# Patient Record
Sex: FEMALE | Race: WHITE | NOT HISPANIC OR LATINO | Employment: UNEMPLOYED | URBAN - NONMETROPOLITAN AREA
[De-identification: names, ages, dates, MRNs, and addresses within clinical notes are randomized per-mention and may not be internally consistent; named-entity substitution may affect disease eponyms.]

---

## 2017-12-26 ENCOUNTER — HOSPITAL ENCOUNTER (EMERGENCY)
Facility: HOSPITAL | Age: 42
Discharge: HOME | End: 2017-12-26
Attending: EMERGENCY MEDICINE | Admitting: EMERGENCY MEDICINE
Payer: COMMERCIAL

## 2017-12-26 VITALS
RESPIRATION RATE: 22 BRPM | DIASTOLIC BLOOD PRESSURE: 86 MMHG | HEART RATE: 102 BPM | SYSTOLIC BLOOD PRESSURE: 161 MMHG | TEMPERATURE: 98.6 F | OXYGEN SATURATION: 88 %

## 2017-12-26 DIAGNOSIS — S01.91XA LACERATION OF HEAD: Primary | ICD-10-CM

## 2017-12-26 PROCEDURE — 90471 IMMUNIZATION ADMIN: CPT

## 2017-12-26 PROCEDURE — 99282 EMERGENCY DEPT VISIT SF MDM: CPT

## 2017-12-26 PROCEDURE — 90715 TDAP VACCINE 7 YRS/> IM: CPT | Performed by: EMERGENCY MEDICINE

## 2017-12-26 RX ORDER — LIDOCAINE HYDROCHLORIDE AND EPINEPHRINE 10; 10 MG/ML; UG/ML
20 INJECTION, SOLUTION INFILTRATION; PERINEURAL ONCE
Status: COMPLETED | OUTPATIENT
Start: 2017-12-26 | End: 2017-12-26

## 2017-12-26 RX ADMIN — LIDOCAINE HYDROCHLORIDE,EPINEPHRINE BITARTRATE 20 ML: 10; .01 INJECTION, SOLUTION INFILTRATION; PERINEURAL at 10:23

## 2017-12-26 RX ADMIN — TETANUS TOXOID, REDUCED DIPHTHERIA TOXOID AND ACELLULAR PERTUSSIS VACCINE, ADSORBED 0.5 ML: 5; 2.5; 8; 8; 2.5 SUSPENSION INTRAMUSCULAR at 10:21

## 2017-12-26 NOTE — DISCHARGE INSTRUCTIONS
Laceration   WHAT YOU NEED TO KNOW:   A laceration is an injury to the skin and the soft tissue underneath it  Lacerations happen when you are cut or hit by something  They can happen anywhere on the body  DISCHARGE INSTRUCTIONS:   Return to the emergency department if:   · You have heavy bleeding or bleeding that does not stop after 10 minutes of holding firm, direct pressure over the wound  · Your wound opens up  Contact your healthcare provider if:   · You have a fever or chills  · Your laceration is red, warm, or swollen  · You have red streaks on your skin coming from your wound  · You have white or yellow drainage from the wound that smells bad  · You have pain that gets worse, even after treatment  · You have questions or concerns about your condition or care  Medicines:   · Prescription pain medicine  may be given  Ask how to take this medicine safely  · Antibiotics  help treat or prevent a bacterial infection  · Take your medicine as directed  Contact your healthcare provider if you think your medicine is not helping or if you have side effects  Tell him or her if you are allergic to any medicine  Keep a list of the medicines, vitamins, and herbs you take  Include the amounts, and when and why you take them  Bring the list or the pill bottles to follow-up visits  Carry your medicine list with you in case of an emergency  Care for your wound as directed:   · Do not get your wound wet  until your healthcare provider says it is okay  Do not soak your wound in water  Do not go swimming until your healthcare provider says it is okay  Carefully wash the wound with soap and water  Gently pat the area dry or allow it to air dry  · Change your bandages  when they get wet, dirty, or after washing  Apply new, clean bandages as directed  Do not apply elastic bandages or tape too tight  Do not put powders or lotions over your incision  · Apply antibiotic ointment as directed  Your healthcare provider may give you antibiotic ointment to put over your wound if you have stitches  If you have strips of tape over your incision, let them dry up and fall off on their own  If they do not fall off within 14 days, gently remove them  If you have glue over your wound, do not remove or pick at it  If your glue comes off, do not replace it with glue that you have at home  · Check your wound every day for signs of infection such as swelling, redness, or pus  Self-care:   · Apply ice  on your wound for 15 to 20 minutes every hour or as directed  Use an ice pack, or put crushed ice in a plastic bag  Cover it with a towel  Ice helps prevent tissue damage and decreases swelling and pain  · Use a splint as directed  A splint will decrease movement and stress on your wound  It may help it heal faster  A splint may be used for lacerations over joints or areas of your body that bend  Ask your healthcare provider how to apply and remove a splint  · Decrease scarring of your wound  by applying ointments as directed  Do not apply ointments until your healthcare provider says it is okay  You may need to wait until your wound is healed  Ask which ointment to buy and how often to use it  After your wound is healed, use sunscreen over the area when you are out in the sun  You should do this for at least 6 months to 1 year after your injury  Follow up with your healthcare provider as directed: You may need to follow up in 24 to 48 hours to have your wound checked for infection  You will need to return in 3 to 14 days if you have stitches or staples so they can be removed  Care for your wound as directed to prevent infection and help it heal  Write down your questions so you remember to ask them during your visits  © 2017 [employer] Information is for End User's use only and may not be sold, redistributed or otherwise used for commercial purposes   All illustrations and images included in [facility] are the copyrighted property of [employer]  or [address]  The above information is an  only  It is not intended as medical advice for individual conditions or treatments  Talk to your doctor, nurse or pharmacist before following any medical regimen to see if it is safe and effective for you

## 2017-12-26 NOTE — ED PROVIDER NOTES
History  Chief Complaint   Patient presents with    Head Laceration     Matty Fine asleep on the toilet and fell forward, tried to catch self by grabbing the garbage can, strike forehead on baseboard  Denies LOC     50-year-old female patient presents to the emergency department for evaluation of a significant left-sided head laceration sustained when she fell asleep on the toilet, falling forward onto a plastic trash can, falling forward further onto a cast iron radiator  The patient is being worked up for sleep apnea believes that she just fell asleep  Currently, the patient has no other complaints  The patient has no headache, no double vision, no blurry vision, no changes in vision, no obvious signs of concussion  Because none of these are present CT scan is needed  The patient's neck was cleared via nexus criteria  Laceration will be seven  Laceration, which is approximately 9 cm in length and through all dural layers an underlying muscle tissue was anesthetized utilizing lidocaine with epinephrine  Patient is prepped and draped in the normal sterile fashion  The area was cleansed thoroughly  Fifteen single simple interrupted sutures were placed with 4-0 and five 0 nylon suture  One subcuticular stitch was placed initially so as to a closely approximate the wound edges  The patient, suffers from morbid obesity, at the end of the procedure was still pain free, symptom free, will be discharged with instructions to follow up in the emergency department should she develops any symptoms of concussion  History provided by:  Patient   used: No    Laceration   Depth:   Through dermis  Bleeding: venous    Laceration mechanism:  Blunt object  Pain details:     Quality:  Aching    Severity:  Mild    Timing:  Constant    Progression:  Worsening  Foreign body present:  No foreign bodies  Relieved by:  Nothing  Worsened by:  Nothing  Ineffective treatments:  None tried  Tetanus status: Unknown  Associated symptoms: swelling    Associated symptoms: no fever, no focal weakness, no numbness, no rash, no redness and no streaking        None       Past Medical History:   Diagnosis Date    Cellulitis     COPD (chronic obstructive pulmonary disease) ([facility] [address] )        History reviewed  No pertinent surgical history  History reviewed  No pertinent family history  I have reviewed and agree with the history as documented  Social History   Substance Use Topics    Smoking status: Former Smoker    Smokeless tobacco: Never Used    Alcohol use No        Review of Systems   Constitutional: Negative for fever  Skin: Negative for rash  Neurological: Negative for focal weakness  All other systems reviewed and are negative  Physical Exam  ED Triage Vitals [12/26/17 0957]   Temperature Pulse Respirations Blood Pressure SpO2   98 6 °F (37 °C) 102 22 161/86 (!) 88 %      Temp Source Heart Rate Source Patient Position - Orthostatic VS BP Location FiO2 (%)   Temporal Monitor Sitting Left arm --      Pain Score       7           Orthostatic Vital Signs  Vitals:    12/26/17 0957   BP: 161/86   Pulse: 102   Patient Position - Orthostatic VS: Sitting       Physical Exam   Constitutional: She is oriented to person, place, and time  She appears well-developed and well-nourished  HENT:   Head: Normocephalic and atraumatic  Right Ear: External ear normal    Left Ear: External ear normal    Eyes: Conjunctivae and EOM are normal    Neck: No JVD present  No tracheal deviation present  No thyromegaly present  Cardiovascular: Normal rate  Pulmonary/Chest: Effort normal and breath sounds normal  No stridor  Abdominal: Soft  She exhibits no distension and no mass  There is no tenderness  There is no guarding  No hernia  Musculoskeletal: Normal range of motion  She exhibits no edema, tenderness or deformity  Lymphadenopathy:     She has no cervical adenopathy     Neurological: She is alert and oriented to person, place, and time  Skin: Skin is warm  No rash noted  No erythema  No pallor  Psychiatric: She has a normal mood and affect  Her behavior is normal    Nursing note and vitals reviewed  ED Medications  Medications   tetanus-diphtheria-acellular pertussis (BOOSTRIX) IM injection 0 5 mL (0 5 mL Intramuscular Given 12/26/17 1021)   lidocaine-epinephrine (XYLOCAINE/EPINEPHRINE) 1 %-1:100,000 injection 20 mL (20 mL Infiltration Given 12/26/17 1023)       Diagnostic Studies  Results Reviewed     None                 No orders to display              Procedures  Procedures       Phone Contacts  ED Phone Contact    ED Course  ED Course                                MDM  Number of Diagnoses or Management Options  Laceration of head: new and requires workup     Amount and/or Complexity of Data Reviewed  Decide to obtain previous medical records or to obtain history from someone other than the patient: yes  Review and summarize past medical records: yes    Patient Progress  Patient progress: stable    CritCare Time    Disposition  Final diagnoses:   Laceration of head     Time reflects when diagnosis was documented in both MDM as applicable and the Disposition within this note     Time User Action Codes Description Comment    12/26/2017 10:54 AM Yenny Sosa Add [S01 [id number]] Laceration of head       ED Disposition     ED Disposition Condition Comment    Discharge  [place] [facility] discharge to home/self care      Condition at discharge: Good        Follow-up Information     Follow up With Specialties Details Why Contact Info Additional [address] [facility], DO Family Medicine In 1 week For suture removal [id number] [facility] [address] [facility] Emergency Department Emergency Medicine In 1 week For suture removal Kaushik Trevizo 1946  [phone] [place] ED, [address], [address]        There are no discharge medications for this patient  No discharge procedures on file      ED Provider  Electronically Signed by           Monique Vaughn DO  12/26/17 1248

## 2020-05-19 ENCOUNTER — OPTICAL OFFICE (OUTPATIENT)
Dept: URBAN - NONMETROPOLITAN AREA CLINIC 4 | Facility: CLINIC | Age: 45
Setting detail: OPHTHALMOLOGY
End: 2020-05-19
Payer: COMMERCIAL

## 2020-05-19 ENCOUNTER — DOCTOR'S OFFICE (OUTPATIENT)
Dept: URBAN - NONMETROPOLITAN AREA CLINIC 1 | Facility: CLINIC | Age: 45
Setting detail: OPHTHALMOLOGY
End: 2020-05-19
Payer: COMMERCIAL

## 2020-05-19 DIAGNOSIS — H52.223: ICD-10-CM

## 2020-05-19 DIAGNOSIS — H52.4: ICD-10-CM

## 2020-05-19 PROCEDURE — V2784 LENS POLYCARB OR EQUAL: HCPCS | Performed by: OPTOMETRIST

## 2020-05-19 PROCEDURE — 92004 COMPRE OPH EXAM NEW PT 1/>: CPT | Performed by: OPTOMETRIST

## 2020-05-19 PROCEDURE — V2020 VISION SVCS FRAMES PURCHASES: HCPCS | Performed by: OPTOMETRIST

## 2020-05-19 PROCEDURE — V2204 LENS SPHCY BIFOCAL 4.00D/2.1: HCPCS | Performed by: OPTOMETRIST

## 2020-05-19 PROCEDURE — V2202 LENS SPHERE BIFOCAL 7.12-20.: HCPCS | Performed by: OPTOMETRIST

## 2020-05-19 PROCEDURE — 92015 DETERMINE REFRACTIVE STATE: CPT | Performed by: OPTOMETRIST

## 2020-05-19 ASSESSMENT — SPHEQUIV_DERIVED
OD_SPHEQUIV: -1.875
OD_SPHEQUIV: -1.75
OS_SPHEQUIV: -2
OS_SPHEQUIV: -2.125

## 2020-05-19 ASSESSMENT — REFRACTION_AUTOREFRACTION
OS_CYLINDER: -2.50
OD_AXIS: 088
OD_SPHERE: -0.50
OS_AXIS: 066
OS_SPHERE: -0.75
OD_CYLINDER: -2.50

## 2020-05-19 ASSESSMENT — REFRACTION_MANIFEST
OS_VA2: 20/25+2
OS_AXIS: 065
OD_CYLINDER: -2.25
OS_VA1: 20/25+2
OD_VA1: 20/25+2
OD_AXIS: 090
OD_SPHERE: -0.75
OS_SPHERE: -1.00
OD_ADD: +1.50
OD_VA2: 20/25+2
OS_CYLINDER: -2.25
OS_ADD: +1.50

## 2020-05-19 ASSESSMENT — VISUAL ACUITY
OS_BCVA: 20/150
OD_BCVA: 20/80-1

## 2020-05-19 ASSESSMENT — CONFRONTATIONAL VISUAL FIELD TEST (CVF)
OD_FINDINGS: FULL
OS_FINDINGS: FULL

## 2021-05-20 ENCOUNTER — DOCTOR'S OFFICE (OUTPATIENT)
Dept: URBAN - NONMETROPOLITAN AREA CLINIC 1 | Facility: CLINIC | Age: 46
Setting detail: OPHTHALMOLOGY
End: 2021-05-20
Payer: COMMERCIAL

## 2021-05-20 DIAGNOSIS — H52.223: ICD-10-CM

## 2021-05-20 DIAGNOSIS — H52.13: ICD-10-CM

## 2021-05-20 DIAGNOSIS — H04.123: ICD-10-CM

## 2021-05-20 DIAGNOSIS — H40.013: ICD-10-CM

## 2021-05-20 DIAGNOSIS — H26.8: ICD-10-CM

## 2021-05-20 PROCEDURE — 92133 CPTRZD OPH DX IMG PST SGM ON: CPT | Performed by: OPTOMETRIST

## 2021-05-20 PROCEDURE — 92014 COMPRE OPH EXAM EST PT 1/>: CPT | Performed by: OPTOMETRIST

## 2021-05-20 PROCEDURE — 92015 DETERMINE REFRACTIVE STATE: CPT | Performed by: OPTOMETRIST

## 2021-05-20 ASSESSMENT — REFRACTION_AUTOREFRACTION
OS_SPHERE: -1.75
OD_SPHERE: -0.50
OD_AXIS: 081
OS_AXIS: 68
OD_CYLINDER: -2.50
OS_CYLINDER: -1.50

## 2021-05-20 ASSESSMENT — REFRACTION_MANIFEST
OS_CYLINDER: -1.25
OD_SPHERE: -1.00
OD_VA1: 20/40
OS_VA1: 20/40
OD_AXIS: 090
OS_SPHERE: -1.25
OS_AXIS: 065
OD_CYLINDER: -1.50

## 2021-05-20 ASSESSMENT — SUPERFICIAL PUNCTATE KERATITIS (SPK)
OD_SPK: 1+
OS_SPK: 1+

## 2021-05-20 ASSESSMENT — VISUAL ACUITY
OS_BCVA: 20/100-1
OD_BCVA: 20/80

## 2021-05-20 ASSESSMENT — SPHEQUIV_DERIVED
OD_SPHEQUIV: -1.75
OS_SPHEQUIV: -1.875
OD_SPHEQUIV: -1.75
OS_SPHEQUIV: -2.5

## 2021-05-20 ASSESSMENT — TONOMETRY
OD_IOP_MMHG: 17
OS_IOP_MMHG: 17

## 2021-06-07 ENCOUNTER — HOSPITAL ENCOUNTER (EMERGENCY)
Facility: HOSPITAL | Age: 46
Discharge: ANOTHER HOSPITAL | End: 2021-06-07
Attending: EMERGENCY MEDICINE
Payer: COMMERCIAL

## 2021-06-07 VITALS
TEMPERATURE: 98.1 F | RESPIRATION RATE: 20 BRPM | HEIGHT: 63 IN | SYSTOLIC BLOOD PRESSURE: 161 MMHG | DIASTOLIC BLOOD PRESSURE: 109 MMHG | BODY MASS INDEX: 40.16 KG/M2 | WEIGHT: 226.63 LBS | HEART RATE: 66 BPM | OXYGEN SATURATION: 100 %

## 2021-06-07 DIAGNOSIS — R10.84 GENERALIZED ABDOMINAL PAIN: Primary | ICD-10-CM

## 2021-06-07 DIAGNOSIS — Z98.84 HISTORY OF BARIATRIC SURGERY: ICD-10-CM

## 2021-06-07 DIAGNOSIS — R11.2 NAUSEA AND VOMITING: ICD-10-CM

## 2021-06-07 LAB
ALBUMIN SERPL BCP-MCNC: 3.7 G/DL (ref 3.5–5)
ALP SERPL-CCNC: 80 U/L (ref 46–116)
ALT SERPL W P-5'-P-CCNC: 50 U/L (ref 12–78)
ANION GAP SERPL CALCULATED.3IONS-SCNC: 11 MMOL/L (ref 4–13)
AST SERPL W P-5'-P-CCNC: 23 U/L (ref 5–45)
BASOPHILS # BLD AUTO: 0.04 THOUSANDS/ΜL (ref 0–0.1)
BASOPHILS NFR BLD AUTO: 1 % (ref 0–1)
BILIRUB SERPL-MCNC: 1.08 MG/DL (ref 0.2–1)
BUN SERPL-MCNC: 17 MG/DL (ref 5–25)
CALCIUM SERPL-MCNC: 9.1 MG/DL (ref 8.3–10.1)
CHLORIDE SERPL-SCNC: 109 MMOL/L (ref 100–108)
CO2 SERPL-SCNC: 25 MMOL/L (ref 21–32)
CREAT SERPL-MCNC: 1.07 MG/DL (ref 0.6–1.3)
EOSINOPHIL # BLD AUTO: 0.1 THOUSAND/ΜL (ref 0–0.61)
EOSINOPHIL NFR BLD AUTO: 1 % (ref 0–6)
ERYTHROCYTE [DISTWIDTH] IN BLOOD BY AUTOMATED COUNT: 18.7 % (ref 11.6–15.1)
GFR SERPL CREATININE-BSD FRML MDRD: 63 ML/MIN/1.73SQ M
GLUCOSE SERPL-MCNC: 110 MG/DL (ref 65–140)
HCT VFR BLD AUTO: 37.8 % (ref 34.8–46.1)
HGB BLD-MCNC: 10.6 G/DL (ref 11.5–15.4)
IMM GRANULOCYTES # BLD AUTO: 0.02 THOUSAND/UL (ref 0–0.2)
IMM GRANULOCYTES NFR BLD AUTO: 0 % (ref 0–2)
LACTATE SERPL-SCNC: 1.4 MMOL/L (ref 0.5–2)
LIPASE SERPL-CCNC: 89 U/L (ref 73–393)
LYMPHOCYTES # BLD AUTO: 2.07 THOUSANDS/ΜL (ref 0.6–4.47)
LYMPHOCYTES NFR BLD AUTO: 26 % (ref 14–44)
MCH RBC QN AUTO: 21.9 PG (ref 26.8–34.3)
MCHC RBC AUTO-ENTMCNC: 28 G/DL (ref 31.4–37.4)
MCV RBC AUTO: 78 FL (ref 82–98)
MONOCYTES # BLD AUTO: 0.49 THOUSAND/ΜL (ref 0.17–1.22)
MONOCYTES NFR BLD AUTO: 6 % (ref 4–12)
NEUTROPHILS # BLD AUTO: 5.15 THOUSANDS/ΜL (ref 1.85–7.62)
NEUTS SEG NFR BLD AUTO: 66 % (ref 43–75)
NRBC BLD AUTO-RTO: 0 /100 WBCS
PLATELET # BLD AUTO: 264 THOUSANDS/UL (ref 149–390)
PMV BLD AUTO: 9.2 FL (ref 8.9–12.7)
POTASSIUM SERPL-SCNC: 3.6 MMOL/L (ref 3.5–5.3)
PROT SERPL-MCNC: 8.2 G/DL (ref 6.4–8.2)
RBC # BLD AUTO: 4.83 MILLION/UL (ref 3.81–5.12)
SODIUM SERPL-SCNC: 145 MMOL/L (ref 136–145)
WBC # BLD AUTO: 7.87 THOUSAND/UL (ref 4.31–10.16)

## 2021-06-07 PROCEDURE — 36415 COLL VENOUS BLD VENIPUNCTURE: CPT | Performed by: EMERGENCY MEDICINE

## 2021-06-07 PROCEDURE — 85025 COMPLETE CBC W/AUTO DIFF WBC: CPT | Performed by: EMERGENCY MEDICINE

## 2021-06-07 PROCEDURE — 83605 ASSAY OF LACTIC ACID: CPT | Performed by: EMERGENCY MEDICINE

## 2021-06-07 PROCEDURE — 96374 THER/PROPH/DIAG INJ IV PUSH: CPT

## 2021-06-07 PROCEDURE — 96375 TX/PRO/DX INJ NEW DRUG ADDON: CPT

## 2021-06-07 PROCEDURE — 99284 EMERGENCY DEPT VISIT MOD MDM: CPT

## 2021-06-07 PROCEDURE — 99285 EMERGENCY DEPT VISIT HI MDM: CPT | Performed by: EMERGENCY MEDICINE

## 2021-06-07 PROCEDURE — 80053 COMPREHEN METABOLIC PANEL: CPT | Performed by: EMERGENCY MEDICINE

## 2021-06-07 PROCEDURE — 83690 ASSAY OF LIPASE: CPT | Performed by: EMERGENCY MEDICINE

## 2021-06-07 RX ORDER — HYDROMORPHONE HCL/PF 1 MG/ML
1 SYRINGE (ML) INJECTION ONCE
Status: COMPLETED | OUTPATIENT
Start: 2021-06-07 | End: 2021-06-07

## 2021-06-07 RX ORDER — MORPHINE SULFATE 10 MG/ML
6 INJECTION, SOLUTION INTRAMUSCULAR; INTRAVENOUS ONCE
Status: COMPLETED | OUTPATIENT
Start: 2021-06-07 | End: 2021-06-07

## 2021-06-07 RX ORDER — ONDANSETRON 2 MG/ML
4 INJECTION INTRAMUSCULAR; INTRAVENOUS ONCE
Status: COMPLETED | OUTPATIENT
Start: 2021-06-07 | End: 2021-06-07

## 2021-06-07 RX ADMIN — ONDANSETRON 4 MG: 2 INJECTION INTRAMUSCULAR; INTRAVENOUS at 01:15

## 2021-06-07 RX ADMIN — HYDROMORPHONE HYDROCHLORIDE 1 MG: 1 INJECTION, SOLUTION INTRAMUSCULAR; INTRAVENOUS; SUBCUTANEOUS at 02:10

## 2021-06-07 RX ADMIN — MORPHINE SULFATE 6 MG: 10 INJECTION INTRAVENOUS at 01:15

## 2021-06-07 NOTE — EMTALA/ACUTE CARE TRANSFER
[address] [facility] EMERGENCY DEPARTMENT  [address] [place] [id number]  Dept: [address] [place] TRANSFER CONSENT    NAME Libra CORADO 1975                              MRN [id number]    I have been informed of my rights regarding examination, treatment, and transfer   by Dr Court Vazquez MD    Benefits: Specialized equipment and/or services available at the receiving facility (Include comment)________________________, Continuity of care    Risks: Potential for delay in receiving treatment, Potential deterioration of medical condition, Loss of IV, Increased discomfort during transfer, Possible worsening of condition or death during transfer      Transfer Request   I acknowledge that my medical condition has been evaluated and explained to me by the emergency department physician or other qualified medical person and/or my attending physician who has recommended and offered to me further medical examination and treatment  I understand the Hospital's obligation with respect to the treatment and stabilization of my emergency medical condition  I nevertheless request to be transferred  I release the Hospital, the doctor, and any other persons caring for me from all responsibility or liability for any injury or ill effects that may result from my transfer and agree to accept all responsibility for the consequences of my choice to transfer, rather than receive stabilizing treatment at the Hospital  I understand that because the transfer is my request, my insurance may not provide reimbursement for the services  The Hospital will assist and direct me and my family in how to make arrangements for transfer, but the hospital is not liable for any fees charged by the transport service    In spite of this understanding, I refuse to consent to further medical examination and treatment which has been offered to me, and request transfer to Accepting Facility Name, [address] : [place], [place]  I authorize the performance of emergency medical procedures and treatments upon me in both transit and upon arrival at the receiving facility  Additionally, I authorize the release of any and all medical records to the receiving facility and request they be transported with me, if possible  I authorize the performance of emergency medical procedures and treatments upon me in both transit and upon arrival at the receiving facility  Additionally, I authorize the release of any and all medical records to the receiving facility and request they be transported with me, if possible  I understand that the safest mode of transportation during a medical emergency is an ambulance and that the Hospital advocates the use of this mode of transport  Risks of traveling to the receiving facility by car, including absence of medical control, life sustaining equipment, such as oxygen, and medical personnel has been explained to me and I fully understand them  (JAMEY CORRECT BOX BELOW)  [  ]  I consent to the stated transfer and to be transported by ambulance/helicopter  [  ]  I consent to the stated transfer, but refuse transportation by ambulance and accept full responsibility for my transportation by car  I understand the risks of non-ambulance transfers and I exonerate the Hospital and its staff from any deterioration in my condition that results from this refusal     X___________________________________________    DATE  21  TIME________  Signature of patient or legally responsible individual signing on patient behalf           RELATIONSHIP TO PATIENT_________________________          Provider Certification    NAME Jamilah Morton                                         1975                              MRN [id number]    A medical screening exam was performed on the above named patient    Based on the examination:    Condition Necessitating Transfer The primary encounter diagnosis was Generalized abdominal pain  Diagnoses of Nausea and vomiting and History of bariatric surgery were also pertinent to this visit  Patient Condition: The patient has been stabilized such that within reasonable medical probability, no material deterioration of the patient condition or the condition of the unborn child(nikolas) is likely to result from the transfer    Reason for Transfer: Level of Care needed not available at this facility, Patient/Family request, No bed available at level of patient's needs    Transfer Requirements: [facility] [address], [facility], [place]   · Space available and qualified personnel available for treatment as acknowledged by    · Agreed to accept transfer and to provide appropriate medical treatment as acknowledged by       Dr Canchola Client  · Appropriate medical records of the examination and treatment of the patient are provided at the time of transfer   [address] [facility], [address] _______  · Transfer will be performed by qualified personnel from    and appropriate transfer equipment as required, including the use of necessary and appropriate life support measures      Provider Certification: I have examined the patient and explained the following risks and benefits of being transferred/refusing transfer to the patient/family:  General risk, such as traffic hazards, adverse weather conditions, rough terrain or turbulence, possible failure of equipment (including vehicle or aircraft), or consequences of actions of persons outside the control of the transport personnel, Unanticipated needs of medical equipment and personnel during transport, Risk of worsening condition      Based on these reasonable risks and benefits to the patient and/or the unborn child(nikolas), and based upon the information available at the time of the patients examination, I certify that the medical benefits reasonably to be expected from the provision of appropriate medical treatments at another medical facility outweigh the increasing risks, if any, to the individuals medical condition, and in the case of labor to the unborn child, from effecting the transfer      X____________________________________________ DATE 06/07/21        TIME_______      ORIGINAL - SEND TO MEDICAL RECORDS   COPY - SEND WITH PATIENT DURING TRANSFER

## 2021-06-07 NOTE — ED PROVIDER NOTES
History  Chief Complaint   Patient presents with    Abdominal Pain     Pt states she had weight loss surgery on the 21st of may  Started tonight at 0790 with periumbilical abdominal pain and vomiting  27-year-old female with past medical history of obesity status post gastric sleeve with revision to biliopancreatic diversion with duodenal switch on May 21st at [facility] who is presenting for evaluation of acute onset generalized abdominal pain with nausea and vomiting  Symptoms began about 3 hours prior to presentation  The patient had been doing well postoperatively until the symptoms started suddenly tonight  No clear precipitating factor for symptoms  The pain is constant and severe  The patient has not taken any medication for the pain as she has been vomiting  The patient thinks that there may be some blood in her vomit  Her bowel movements have been normal   The patient denies any fever, chills, URI symptoms, cough, chest pain, or shortness of breath  As mentioned above, the patient just had her surgery performed on May 21st at [facility]  She underwent a laparoscopic biliopancreatic diversion with duodenal switch as a revision of a previous gastric sleeve surgery  She also underwent laparoscopic liver biopsy, laparoscopic cholecystectomy, and laparoscopic umbilical hernia repair at the same time  Patient last saw her surgeon on June 1st, 6 days ago  She had been doing well postoperatively per his note  None       Past Medical History:   Diagnosis Date    Cellulitis     COPD (chronic obstructive pulmonary disease) ([facility] [address] )        History reviewed  No pertinent surgical history  History reviewed  No pertinent family history  I have reviewed and agree with the history as documented      E-Cigarette/Vaping     E-Cigarette/Vaping Substances     Social History     Tobacco Use    Smoking status: Former Smoker    Smokeless tobacco: Never Used   Substance Use Topics    Alcohol use: No    Drug use: No       Review of Systems   Constitutional: Negative for diaphoresis, fever and unexpected weight change  HENT: Negative for congestion, rhinorrhea and sore throat  Eyes: Negative for pain, discharge and visual disturbance  Respiratory: Negative for cough, shortness of breath and wheezing  Cardiovascular: Negative for chest pain, palpitations and leg swelling  Gastrointestinal: Positive for abdominal pain, nausea and vomiting  Negative for blood in stool, constipation and diarrhea  Genitourinary: Negative for dysuria, flank pain and hematuria  Musculoskeletal: Negative for arthralgias and joint swelling  Skin: Negative for rash and wound  Allergic/Immunologic: Negative for environmental allergies and food allergies  Neurological: Negative for dizziness, seizures, weakness and numbness  Hematological: Negative for adenopathy  Psychiatric/Behavioral: Negative for confusion and hallucinations  Physical Exam  Physical Exam  Vitals signs and nursing note reviewed  Constitutional:       General: She is in acute distress  Appearance: She is well-developed  Comments: Patient actively vomiting and dry heaving, in acute distress  HENT:      Head: Normocephalic and atraumatic  Right Ear: External ear normal       Left Ear: External ear normal    Eyes:      Conjunctiva/sclera: Conjunctivae normal       Pupils: Pupils are equal, round, and reactive to light  Cardiovascular:      Rate and Rhythm: Normal rate and regular rhythm  Heart sounds: Normal heart sounds  No murmur  Pulmonary:      Effort: Pulmonary effort is normal  No respiratory distress  Breath sounds: Normal breath sounds  No wheezing or rales  Abdominal:      General: Bowel sounds are normal  There is no distension  Palpations: Abdomen is soft  Tenderness: There is abdominal tenderness  There is guarding        Comments: Diffuse tenderness to palpation with voluntary guarding  Umbilical hernia noted  Musculoskeletal: Normal range of motion  General: No deformity  Skin:     General: Skin is warm and dry  Capillary Refill: Capillary refill takes less than 2 seconds  Neurological:      Mental Status: She is alert and oriented to person, place, and time  Comments: No gross motor deficits noted  Cranial nerves II-XII are intact  Speech is normal, without dysarthria or aphasia  Psychiatric:         Mood and Affect: Mood normal          Behavior: Behavior normal          Vital Signs  ED Triage Vitals [06/07/21 0059]   Temperature Pulse Respirations Blood Pressure SpO2   98 1 °F (36 7 °C) 73 22 154/87 100 %      Temp Source Heart Rate Source Patient Position - Orthostatic VS BP Location FiO2 (%)   Temporal Monitor Lying Right arm --      Pain Score       Worst Possible Pain           Vitals:    06/07/21 0059 06/07/21 0200   BP: 154/87 (!) 161/109   Pulse: 73 66   Patient Position - Orthostatic VS: Lying          Visual Acuity      ED Medications  Medications   ondansetron (ZOFRAN) injection 4 mg (4 mg Intravenous Given 6/7/21 0115)   morphine (PF) 10 mg/mL injection 6 mg (6 mg Intravenous Given 6/7/21 0115)   HYDROmorphone (DILAUDID) injection 1 mg (1 mg Intravenous Given 6/7/21 0210)       Diagnostic Studies  Results Reviewed     Procedure Component Value Units Date/Time    Lactic acid, plasma [[id number]]  (Normal) Collected: 06/07/21 0113    Lab Status: Final result Specimen: Blood from Arm, Right Updated: 06/07/21 0148     LACTIC ACID 1 4 mmol/L     Narrative:      Result may be elevated if tourniquet was used during collection      Comprehensive metabolic panel [[id number]]  (Abnormal) Collected: 06/07/21 0113    Lab Status: Final result Specimen: Blood from Arm, Right Updated: 06/07/21 0140     Sodium 145 mmol/L      Potassium 3 6 mmol/L      Chloride 109 mmol/L      CO2 25 mmol/L      ANION GAP 11 mmol/L      BUN 17 mg/dL      Creatinine 1 07 mg/dL Glucose 110 mg/dL      Calcium 9 1 mg/dL      AST 23 U/L      ALT 50 U/L      Alkaline Phosphatase 80 U/L      Total Protein 8 2 g/dL      Albumin 3 7 g/dL      Total Bilirubin 1 08 mg/dL      eGFR 63 ml/min/1 73sq m     Narrative:      [place] guidelines for Chronic Kidney Disease (CKD):     Stage 1 with normal or high GFR (GFR > 90 mL/min/1 73 square meters)    Stage 2 Mild CKD (GFR = 60-89 mL/min/1 73 square meters)    Stage 3A Moderate CKD (GFR = 45-59 mL/min/1 73 square meters)    Stage 3B Moderate CKD (GFR = 30-44 mL/min/1 73 square meters)    Stage 4 Severe CKD (GFR = 15-29 mL/min/1 73 square meters)    Stage 5 End Stage CKD (GFR <15 mL/min/1 73 square meters)  Note: GFR calculation is accurate only with a steady state creatinine    Lipase [[id number]]  (Normal) Collected: 06/07/21 0113    Lab Status: Final result Specimen: Blood from Arm, Right Updated: 06/07/21 0137     Lipase 89 u/L     CBC and differential [[id number]]  (Abnormal) Collected: 06/07/21 0113    Lab Status: Final result Specimen: Blood from Arm, Right Updated: 06/07/21 0122     WBC 7 87 Thousand/uL      RBC 4 83 Million/uL      Hemoglobin 10 6 g/dL      Hematocrit 37 8 %      MCV 78 fL      MCH 21 9 pg      MCHC 28 0 g/dL      RDW 18 7 %      MPV 9 2 fL      Platelets 670 Thousands/uL      nRBC 0 /100 WBCs      Neutrophils Relative 66 %      Immat GRANS % 0 %      Lymphocytes Relative 26 %      Monocytes Relative 6 %      Eosinophils Relative 1 %      Basophils Relative 1 %      Neutrophils Absolute 5 15 Thousands/µL      Immature Grans Absolute 0 02 Thousand/uL      Lymphocytes Absolute 2 07 Thousands/µL      Monocytes Absolute 0 49 Thousand/µL      Eosinophils Absolute 0 10 Thousand/µL      Basophils Absolute 0 04 Thousands/µL                  No orders to display              Procedures  Procedures         ED Course  ED Course as of Jun 07 0446   Mon Jun 07, 2021   0113 Patient had her bariatric procedure performed at [facility] on 5/21  Will reach out to the Bariatric Team at [facility]  High index of suspicion for internal hernia or complication related to her recent procedure  0122 Hemoglobin(!): 10 6   0122 MCV(!): 78   0123 Microcytic anemia noted  [address] Williams Porras  He agreed with the workup I ordered  We discussed getting a CT  He said that CT could be deferred until the patient arrived at [facility]  Patient will be transferred at [address] ACID: 1 [id number]   0151 Lipase: 89                                           MDM  Number of Diagnoses or Management Options  Generalized abdominal pain: new and requires workup  History of bariatric surgery: established and worsening  Nausea and vomiting: new and requires workup  Diagnosis management comments:     Based on patient's clinical presentation, I had a high index of suspicion for internal hernia causing bowel ischemia or an SBO  Also considered an anastomotic leak or marginal ulcer  Certainly, other conditions such as pancreatitis, choledocholithiasis, perforated viscus, and other acute intra-abdominal pathology could have explained the patient's symptoms as well  The patient was treated with IV antiemetics, IV analgesics, and IV fluids  I spoke with the patient's Bariatric Surgeon, Dr Williams Porras  He agreed with the workup I ordered  We discussed possibly getting a CT prior to transfer but the patient would need to drink oral contrast and this would delay her transfer  Dr Williams Porras was in agreement with deferring CT until the patient arrived at [facility]  Labs were significant for microcytic anemia but were otherwise normal   The patient was transferred to [facility] in stable condition  She was updated regarding the need for transfer and was agreeable to the plan of care         Amount and/or Complexity of Data Reviewed  Clinical lab tests: ordered and reviewed  Decide to obtain previous medical records or to obtain history from someone other than the patient: yes  Obtain history from someone other than the patient: yes  Review and summarize past medical records: yes  Discuss the patient with other providers: yes    Risk of Complications, Morbidity, and/or Mortality  Presenting problems: high  Diagnostic procedures: minimal  Management options: minimal    Patient Progress  Patient progress: improved      Disposition  Final diagnoses:   Generalized abdominal pain   Nausea and vomiting   History of bariatric surgery     Time reflects when diagnosis was documented in both MDM as applicable and the Disposition within this note     Time User Action Codes Description Comment    6/7/2021  1:53 AM Dunsmuir Land Add [R10 84] Generalized abdominal pain     6/7/2021  1:53 AM Dunsmuir Land Add [R11 2] Nausea and vomiting     6/7/2021  1:53 AM Dunsmuir Land Add [Z98 84] Hx of bariatric surgery     6/7/2021  1:53 AM Dunsmuir Land Remove [Z98 84] Hx of bariatric surgery     6/7/2021  1:53 AM Dunsmuir Land Add [Z98 84] History of bariatric surgery       ED Disposition     ED Disposition Condition Date/Time Comment    Transfer to Another [facility]  Mon Jun 7, 2021  1:53 AM Josh Sanchez should be transferred out to [facility]          MD Documentation      Most Recent Value   Patient Condition  The patient has been stabilized such that within reasonable medical probability, no material deterioration of the patient condition or the condition of the unborn child(nikolas) is likely to result from the transfer   Reason for Transfer  Level of Care needed not available at this facility, Patient/Family request, No bed available at level of patient's needs   Benefits of Transfer  Specialized equipment and/or services available at the receiving facility (Include comment)________________________, Continuity of care   Risks of Transfer  Potential for delay in receiving treatment, Potential deterioration of medical condition, Loss of IV, Increased discomfort during transfer, Possible worsening of condition or death during transfer   Accepting Physician  Dr Jojo Dietrich Name, Tatyana Veronica   Provider Certification  General risk, such as traffic hazards, adverse weather conditions, rough terrain or turbulence, possible failure of equipment (including vehicle or aircraft), or consequences of actions of persons outside the control of the transport personnel, Unanticipated needs of medical equipment and personnel during transport, Risk of worsening condition      RN Documentation      [address] Name, Tatyana Moyer Group Assignment  ED   Report Given to  Tani RN       Follow-up Information    None         There are no discharge medications for this patient  No discharge procedures on file      PDMP Review     None          ED Provider  Electronically Signed by           Justin Lane MD  06/07/21 0839

## 2021-06-10 ENCOUNTER — RX ONLY (RX ONLY)
Age: 46
End: 2021-06-10

## 2021-06-10 ENCOUNTER — DOCTOR'S OFFICE (OUTPATIENT)
Dept: URBAN - NONMETROPOLITAN AREA CLINIC 1 | Facility: CLINIC | Age: 46
Setting detail: OPHTHALMOLOGY
End: 2021-06-10
Payer: COMMERCIAL

## 2021-06-10 DIAGNOSIS — H04.123: ICD-10-CM

## 2021-06-10 DIAGNOSIS — H52.223: ICD-10-CM

## 2021-06-10 DIAGNOSIS — H26.8: ICD-10-CM

## 2021-06-10 DIAGNOSIS — H40.013: ICD-10-CM

## 2021-06-10 PROCEDURE — 92083 EXTENDED VISUAL FIELD XM: CPT | Performed by: OPTOMETRIST

## 2021-06-10 PROCEDURE — 76514 ECHO EXAM OF EYE THICKNESS: CPT | Performed by: OPTOMETRIST

## 2021-06-10 PROCEDURE — 92012 INTRM OPH EXAM EST PATIENT: CPT | Performed by: OPTOMETRIST

## 2021-06-10 ASSESSMENT — REFRACTION_AUTOREFRACTION
OS_CYLINDER: -1.50
OD_AXIS: 081
OS_AXIS: 68
OD_SPHERE: -0.50
OS_SPHERE: -1.75
OD_CYLINDER: -2.50

## 2021-06-10 ASSESSMENT — PACHYMETRY
OD_CT_UM: 609
OS_CT_CORRECTION: -5
OD_CT_CORRECTION: -4
OS_CT_UM: 618

## 2021-06-10 ASSESSMENT — SPHEQUIV_DERIVED
OD_SPHEQUIV: -1.75
OD_SPHEQUIV: -1.75
OS_SPHEQUIV: -2.5
OS_SPHEQUIV: -1.875

## 2021-06-10 ASSESSMENT — REFRACTION_MANIFEST
OD_AXIS: 090
OS_CYLINDER: -1.25
OS_VA1: 20/40
OS_AXIS: 065
OD_SPHERE: -1.00
OD_VA1: 20/40
OS_SPHERE: -1.25
OD_CYLINDER: -1.50

## 2021-06-10 ASSESSMENT — CONFRONTATIONAL VISUAL FIELD TEST (CVF)
OS_FINDINGS: FULL
OD_FINDINGS: FULL

## 2021-06-10 ASSESSMENT — VISUAL ACUITY
OD_BCVA: 20/150-1
OS_BCVA: 20/100-1

## 2021-06-10 ASSESSMENT — TONOMETRY
OD_IOP_MMHG: 16
OS_IOP_MMHG: 15

## 2021-06-10 ASSESSMENT — SUPERFICIAL PUNCTATE KERATITIS (SPK)
OS_SPK: 1+
OD_SPK: 1+

## 2022-03-03 ENCOUNTER — APPOINTMENT (EMERGENCY)
Dept: RADIOLOGY | Facility: HOSPITAL | Age: 47
End: 2022-03-03
Payer: COMMERCIAL

## 2022-03-03 ENCOUNTER — HOSPITAL ENCOUNTER (EMERGENCY)
Facility: HOSPITAL | Age: 47
Discharge: HOME | End: 2022-03-03
Attending: EMERGENCY MEDICINE | Admitting: EMERGENCY MEDICINE
Payer: COMMERCIAL

## 2022-03-03 VITALS
DIASTOLIC BLOOD PRESSURE: 92 MMHG | HEART RATE: 70 BPM | OXYGEN SATURATION: 97 % | TEMPERATURE: 97.1 F | RESPIRATION RATE: 18 BRPM | SYSTOLIC BLOOD PRESSURE: 181 MMHG

## 2022-03-03 DIAGNOSIS — M19.072 OSTEOARTHRITIS OF LEFT FOOT: ICD-10-CM

## 2022-03-03 DIAGNOSIS — L03.116 CELLULITIS OF LEFT FOOT: Primary | ICD-10-CM

## 2022-03-03 DIAGNOSIS — M77.42 METATARSALGIA OF LEFT FOOT: ICD-10-CM

## 2022-03-03 LAB
ANION GAP SERPL CALCULATED.3IONS-SCNC: 7 MMOL/L (ref 4–13)
BASOPHILS # BLD AUTO: 0.03 THOUSANDS/ΜL (ref 0–0.1)
BASOPHILS NFR BLD AUTO: 1 % (ref 0–1)
BUN SERPL-MCNC: 12 MG/DL (ref 5–25)
CALCIUM SERPL-MCNC: 8.3 MG/DL (ref 8.3–10.1)
CHLORIDE SERPL-SCNC: 107 MMOL/L (ref 100–108)
CO2 SERPL-SCNC: 29 MMOL/L (ref 21–32)
CREAT SERPL-MCNC: 0.8 MG/DL (ref 0.6–1.3)
D DIMER PPP FEU-MCNC: 0.38 UG/ML FEU
EOSINOPHIL # BLD AUTO: 0.09 THOUSAND/ΜL (ref 0–0.61)
EOSINOPHIL NFR BLD AUTO: 1 % (ref 0–6)
ERYTHROCYTE [DISTWIDTH] IN BLOOD BY AUTOMATED COUNT: 13.7 % (ref 11.6–15.1)
GFR SERPL CREATININE-BSD FRML MDRD: 89 ML/MIN/1.73SQ M
GLUCOSE SERPL-MCNC: 90 MG/DL (ref 65–140)
HCT VFR BLD AUTO: 35.4 % (ref 34.8–46.1)
HGB BLD-MCNC: 11.7 G/DL (ref 11.5–15.4)
IMM GRANULOCYTES # BLD AUTO: 0.01 THOUSAND/UL (ref 0–0.2)
IMM GRANULOCYTES NFR BLD AUTO: 0 % (ref 0–2)
LYMPHOCYTES # BLD AUTO: 1.8 THOUSANDS/ΜL (ref 0.6–4.47)
LYMPHOCYTES NFR BLD AUTO: 28 % (ref 14–44)
MCH RBC QN AUTO: 28.3 PG (ref 26.8–34.3)
MCHC RBC AUTO-ENTMCNC: 33.1 G/DL (ref 31.4–37.4)
MCV RBC AUTO: 86 FL (ref 82–98)
MONOCYTES # BLD AUTO: 0.64 THOUSAND/ΜL (ref 0.17–1.22)
MONOCYTES NFR BLD AUTO: 10 % (ref 4–12)
NEUTROPHILS # BLD AUTO: 3.87 THOUSANDS/ΜL (ref 1.85–7.62)
NEUTS SEG NFR BLD AUTO: 60 % (ref 43–75)
NRBC BLD AUTO-RTO: 0 /100 WBCS
PLATELET # BLD AUTO: 188 THOUSANDS/UL (ref 149–390)
PMV BLD AUTO: 8.9 FL (ref 8.9–12.7)
POTASSIUM SERPL-SCNC: 3.6 MMOL/L (ref 3.5–5.3)
RBC # BLD AUTO: 4.13 MILLION/UL (ref 3.81–5.12)
SODIUM SERPL-SCNC: 143 MMOL/L (ref 136–145)
URATE SERPL-MCNC: 5.2 MG/DL (ref 2–6.8)
WBC # BLD AUTO: 6.44 THOUSAND/UL (ref 4.31–10.16)

## 2022-03-03 PROCEDURE — 99283 EMERGENCY DEPT VISIT LOW MDM: CPT

## 2022-03-03 PROCEDURE — 96372 THER/PROPH/DIAG INJ SC/IM: CPT

## 2022-03-03 PROCEDURE — 36415 COLL VENOUS BLD VENIPUNCTURE: CPT | Performed by: PHYSICIAN ASSISTANT

## 2022-03-03 PROCEDURE — 85379 FIBRIN DEGRADATION QUANT: CPT | Performed by: PHYSICIAN ASSISTANT

## 2022-03-03 PROCEDURE — 84550 ASSAY OF BLOOD/URIC ACID: CPT | Performed by: PHYSICIAN ASSISTANT

## 2022-03-03 PROCEDURE — 99284 EMERGENCY DEPT VISIT MOD MDM: CPT | Performed by: PHYSICIAN ASSISTANT

## 2022-03-03 PROCEDURE — 80048 BASIC METABOLIC PNL TOTAL CA: CPT | Performed by: PHYSICIAN ASSISTANT

## 2022-03-03 PROCEDURE — 85025 COMPLETE CBC W/AUTO DIFF WBC: CPT | Performed by: PHYSICIAN ASSISTANT

## 2022-03-03 PROCEDURE — 73630 X-RAY EXAM OF FOOT: CPT

## 2022-03-03 RX ORDER — KETOROLAC TROMETHAMINE 30 MG/ML
15 INJECTION, SOLUTION INTRAMUSCULAR; INTRAVENOUS ONCE
Status: COMPLETED | OUTPATIENT
Start: 2022-03-03 | End: 2022-03-03

## 2022-03-03 RX ORDER — CEPHALEXIN 500 MG/1
500 CAPSULE ORAL EVERY 6 HOURS SCHEDULED
Qty: 28 CAPSULE | Refills: 0 | Status: SHIPPED | OUTPATIENT
Start: 2022-03-03 | End: 2022-03-10

## 2022-03-03 RX ADMIN — KETOROLAC TROMETHAMINE 15 MG: 30 INJECTION, SOLUTION INTRAMUSCULAR at 12:10

## 2022-03-03 NOTE — ED PROVIDER NOTES
History  Chief Complaint   Patient presents with    Foot Pain     pt c/o left foot pain and swelling for the past 4 days  pt denies any injury     Patient is a 27-year-old female with a past medical history of COPD, presenting to the ED for evaluation of left foot pain and swelling times 3-4 days  Patient denies any specific injuries but is on her feet a lot for work  She has noticed increased swelling over the dorsum of the left foot with associated pain  She also reports pain over the lateral aspect of the foot over the 5th metatarsal   She denies any fevers or chills  She has had increased difficulty bearing weight secondary to pain  She denies any numbness or weakness of the extremity  She has not taken any medications for pain  She denies a history of DVT or gout  She does have a history of cellulitis in the right foot  None       Past Medical History:   Diagnosis Date    Cellulitis     COPD (chronic obstructive pulmonary disease) ([facility] [address] )        Past Surgical History:   Procedure Laterality Date    GASTRECTOMY SLEEVE LAPAROSCOPIC      HERNIA REPAIR         History reviewed  No pertinent family history  I have reviewed and agree with the history as documented  E-Cigarette/Vaping    E-Cigarette Use Never User      E-Cigarette/Vaping Substances     Social History     Tobacco Use    Smoking status: Former Smoker    Smokeless tobacco: Never Used   Vaping Use    Vaping Use: Never used   Substance Use Topics    Alcohol use: No    Drug use: No       Review of Systems   Constitutional: Negative for appetite change, chills, fatigue and fever  HENT: Negative for congestion, rhinorrhea, sinus pressure, sinus pain and sore throat  Eyes: Negative for photophobia and visual disturbance  Respiratory: Negative for cough, shortness of breath and wheezing  Cardiovascular: Negative for chest pain, palpitations and leg swelling     Gastrointestinal: Negative for abdominal pain, blood in stool, constipation, diarrhea, nausea and vomiting  Genitourinary: Negative for difficulty urinating, dysuria, flank pain, frequency, hematuria and urgency  Musculoskeletal: Positive for myalgias (Left foot pain/swelling)  Negative for arthralgias, back pain, joint swelling and neck pain  Neurological: Negative for dizziness, syncope, weakness, light-headedness and headaches  All other systems reviewed and are negative  Physical Exam  Physical Exam  Vitals and nursing note reviewed  Constitutional:       General: She is awake  Appearance: Normal appearance  She is well-developed  She is not toxic-appearing or diaphoretic  HENT:      Head: Normocephalic and atraumatic  Right Ear: External ear normal       Left Ear: External ear normal       Nose: Nose normal       Mouth/Throat:      Lips: Pink  Mouth: Mucous membranes are moist    Eyes:      General: Lids are normal  No scleral icterus  Conjunctiva/sclera: Conjunctivae normal       Pupils: Pupils are equal, round, and reactive to light  Cardiovascular:      Rate and Rhythm: Normal rate and regular rhythm  Pulses: Normal pulses  Radial pulses are 2+ on the right side and 2+ on the left side  Heart sounds: Normal heart sounds, S1 normal and S2 normal    Pulmonary:      Effort: Pulmonary effort is normal  No accessory muscle usage  Breath sounds: Normal breath sounds  No stridor  No decreased breath sounds, wheezing, rhonchi or rales  Abdominal:      General: Abdomen is flat  Bowel sounds are normal  There is no distension  Palpations: Abdomen is soft  Tenderness: There is no abdominal tenderness  There is no right CVA tenderness, left CVA tenderness, guarding or rebound  Musculoskeletal:      Cervical back: Full passive range of motion without pain and neck supple  No signs of trauma  No pain with movement  Right lower leg: No edema  Left lower leg: No edema          Feet:    Feet: Comments: Tenderness over the plantar region of the foot, dorsum of the foot, and lateral metatarsals; mild overlying warmth/edema without significant erythema; no calf tenderness or swelling; PT/DP pulses 2+; sensation intact; cap refill <2 seconds  Lymphadenopathy:      Cervical: No cervical adenopathy  Skin:     General: Skin is warm and dry  Capillary Refill: Capillary refill takes less than 2 seconds  Coloration: Skin is not cyanotic, jaundiced or pale  Neurological:      Mental Status: She is alert and oriented to person, place, and time  GCS: GCS eye subscore is 4  GCS verbal subscore is 5  GCS motor subscore is 6  Gait: Gait normal    Psychiatric:         Mood and Affect: Mood normal          Speech: Speech normal          Behavior: Behavior is cooperative           Vital Signs  ED Triage Vitals   Temperature Pulse Respirations Blood Pressure SpO2   03/03/22 1149 03/03/22 1152 03/03/22 1149 03/03/22 1152 03/03/22 1152   (!) 97 1 °F (36 2 °C) 70 18 (!) 181/92 97 %      Temp Source Heart Rate Source Patient Position - Orthostatic VS BP Location FiO2 (%)   03/03/22 1149 03/03/22 1149 03/03/22 1149 03/03/22 1149 --   Temporal Monitor Lying Right arm       Pain Score       03/03/22 1149       10 - Worst Possible Pain           Vitals:    03/03/22 1149 03/03/22 1152   BP:  (!) 181/92   Pulse:  70   Patient Position - Orthostatic VS: Lying          Visual Acuity      ED Medications  Medications   ketorolac (TORADOL) injection 15 mg (15 mg Intramuscular Given 3/3/22 1210)       Diagnostic Studies  Results Reviewed     Procedure Component Value Units Date/Time    Uric acid [[id number]]  (Normal) Collected: 03/03/22 1248    Lab Status: Final result Specimen: Blood Updated: 03/03/22 1302     Uric Acid 5 2 mg/dL     D-Dimer [[id number]]  (Normal) Collected: 03/03/22 1216    Lab Status: Final result Specimen: Blood from Arm, Left Updated: 03/03/22 1244     D-Dimer, Quant 0 38 ug/ml FEU     Basic metabolic panel [[id number]] Collected: 03/03/22 1216    Lab Status: Final result Specimen: Blood from Arm, Left Updated: 03/03/22 1232     Sodium 143 mmol/L      Potassium 3 6 mmol/L      Chloride 107 mmol/L      CO2 29 mmol/L      ANION GAP 7 mmol/L      BUN 12 mg/dL      Creatinine 0 80 mg/dL      Glucose 90 mg/dL      Calcium 8 3 mg/dL      eGFR 89 ml/min/1 73sq m     Narrative:      [place] guidelines for Chronic Kidney Disease (CKD):     Stage 1 with normal or high GFR (GFR > 90 mL/min/1 73 square meters)    Stage 2 Mild CKD (GFR = 60-89 mL/min/1 73 square meters)    Stage 3A Moderate CKD (GFR = 45-59 mL/min/1 73 square meters)    Stage 3B Moderate CKD (GFR = 30-44 mL/min/1 73 square meters)    Stage 4 Severe CKD (GFR = 15-29 mL/min/1 73 square meters)    Stage 5 End Stage CKD (GFR <15 mL/min/1 73 square meters)  Note: GFR calculation is accurate only with a steady state creatinine    CBC and differential [[id number]] Collected: 03/03/22 1216    Lab Status: Final result Specimen: Blood from Arm, Left Updated: 03/03/22 1223     WBC 6 44 Thousand/uL      RBC 4 13 Million/uL      Hemoglobin 11 7 g/dL      Hematocrit 35 4 %      MCV 86 fL      MCH 28 3 pg      MCHC 33 1 g/dL      RDW 13 7 %      MPV 8 9 fL      Platelets 320 Thousands/uL      nRBC 0 /100 WBCs      Neutrophils Relative 60 %      Immat GRANS % 0 %      Lymphocytes Relative 28 %      Monocytes Relative 10 %      Eosinophils Relative 1 %      Basophils Relative 1 %      Neutrophils Absolute 3 87 Thousands/µL      Immature Grans Absolute 0 01 Thousand/uL      Lymphocytes Absolute 1 80 Thousands/µL      Monocytes Absolute 0 64 Thousand/µL      Eosinophils Absolute 0 09 Thousand/µL      Basophils Absolute 0 03 Thousands/µL                  XR foot 3+ views LEFT   Final Result by Lauren Prabhakar MD (03/03 1313)      No acute osseous abnormality  Degenerative changes as described  Soft tissue swelling  Workstation performed: [id number]                    Procedures  Procedures         ED Course                                             MDM  Number of Diagnoses or Management Options  Cellulitis of left foot  Metatarsalgia of left foot  Osteoarthritis of left foot  Diagnosis management comments: Patient is a 44-year-old female with a past medical history of COPD, presenting to the ED for evaluation of left foot pain and swelling times 3-4 days  DDX including but not limited to: sprain, strain, stress fracture, cellulitis, osteomyelitis, gout, tendinitis, plantar fasciitis, arthritis, doubt septic arthritis  X-ray shows evidence of osteoarthritis but no other acute abnormalities  D-dimer negative  Other labs are normal   Uric acid is within normal limits  Due to concern for an early cellulitis, will send a course of antibiotics to patient's pharmacy  Podiatry referral provided  Patient was given crutches to use as needed as well as a surgical shoe  Advised patient to return for any fevers, chills or worsening pain/swelling  The management plan was discussed in detail with the patient at bedside and all questions were answered  Prior to discharge, verbal and written instructions provided  ED return precautions discussed in detail  The patient verbalized understanding of our discussion and plan of care, and agrees to return to the Emergency Department for concerns and progression of illness         Amount and/or Complexity of Data Reviewed  Clinical lab tests: reviewed and ordered  Tests in the radiology section of CPT®: ordered and reviewed    Patient Progress  Patient progress: stable      Disposition  Final diagnoses:   Cellulitis of left foot   Metatarsalgia of left foot   Osteoarthritis of left foot     Time reflects when diagnosis was documented in both MDM as applicable and the Disposition within this note     Time User Action Codes Description Comment    3/3/2022  1:20 PM Bill Martinez [Q19 493] Cellulitis of left foot     3/3/2022  1:25 PM RosalbaCarleen Add [M77 42] Metatarsalgia of left foot     3/3/2022  1:36 PM Cammie Conley Add [Y75 939] Osteoarthritis of left foot       ED Disposition     ED Disposition Condition Date/Time Comment    Discharge Stable Thu Mar 3, 2022  1:20 PM Zackery Olvera discharge to home/self care              Follow-up Information     Follow up With Specialties Details Why Contact Info Additional [address] [facility] [place] [facility] Schedule an appointment as soon as possible for a visit   [address] [facility] [id number] [address] [facility] [address] [place], [place], [facility], DO Family Medicine Schedule an appointment as soon as possible for a visit   [address] Emergency Department Emergency Medicine  If symptoms worsen [place]  [phone]  [address] [facility] Emergency Department[address], [address]          Patient's Medications   Discharge Prescriptions    CEPHALEXIN (KEFLEX) 500 MG CAPSULE    Take 1 capsule (500 mg total) by mouth every 6 (six) hours for 7 days       Start Date: 3/3/2022  End Date: 3/10/2022       Order Dose: 500 mg       Quantity: 28 capsule    Refills: 0           PDMP Review     None          ED Provider  Electronically Signed by           Lieutenant Jarek PA-C  03/03/22 7280

## 2022-03-10 ENCOUNTER — TELEPHONE (OUTPATIENT)
Dept: PODIATRY | Facility: CLINIC | Age: 47
End: 2022-03-10

## 2022-03-10 NOTE — TELEPHONE ENCOUNTER
Spoke with patient and told her to call pcp to have them do a  [employer] referral done on baylee for the office visit

## 2023-07-31 ENCOUNTER — HOSPITAL ENCOUNTER (EMERGENCY)
Facility: HOSPITAL | Age: 48
Discharge: HOME | End: 2023-07-31
Attending: EMERGENCY MEDICINE | Admitting: EMERGENCY MEDICINE
Payer: COMMERCIAL

## 2023-07-31 VITALS
TEMPERATURE: 97.3 F | SYSTOLIC BLOOD PRESSURE: 140 MMHG | HEART RATE: 68 BPM | DIASTOLIC BLOOD PRESSURE: 79 MMHG | WEIGHT: 220 LBS | HEIGHT: 61 IN | RESPIRATION RATE: 20 BRPM | OXYGEN SATURATION: 98 % | BODY MASS INDEX: 41.54 KG/M2

## 2023-07-31 DIAGNOSIS — L03.116 LEFT LEG CELLULITIS: Primary | ICD-10-CM

## 2023-07-31 PROCEDURE — 99284 EMERGENCY DEPT VISIT MOD MDM: CPT | Performed by: PHYSICIAN ASSISTANT

## 2023-07-31 PROCEDURE — 99282 EMERGENCY DEPT VISIT SF MDM: CPT

## 2023-07-31 RX ORDER — CEPHALEXIN 500 MG/1
500 CAPSULE ORAL EVERY 6 HOURS SCHEDULED
Qty: 28 CAPSULE | Refills: 0 | Status: SHIPPED | OUTPATIENT
Start: 2023-07-31 | End: 2023-08-07

## 2023-07-31 NOTE — ED PROVIDER NOTES
History  Chief Complaint   Patient presents with   • Leg Swelling     Left leg pain, swelling, redness. Bottom of foot hurts. Pt concerned for cellulitis     60-year-old female here for evaluation of left lower leg redness and warmth. She presents ambulatory via private vehicle no acute distress with family. She states that she initially began with some redness about a week and a half ago she had a venous duplex study completed which was negative for DVT per her bedside history. She feels as though she has an infection in the leg. She denies trauma she denies any systemic symptoms such as fevers chills or sweats she feels that she has a slight upset stomach. No history she denies history of chest pain or shortness of breath. None       Past Medical History:   Diagnosis Date   • Cellulitis    • COPD (chronic obstructive pulmonary disease) ([address])        Past Surgical History:   Procedure Laterality Date   • GASTRECTOMY SLEEVE LAPAROSCOPIC     • HERNIA REPAIR         History reviewed. No pertinent family history. I have reviewed and agree with the history as documented. E-Cigarette/Vaping   • E-Cigarette Use Never User      E-Cigarette/Vaping Substances     Social History     Tobacco Use   • Smoking status: Former   • Smokeless tobacco: Never   Vaping Use   • Vaping Use: Never used   Substance Use Topics   • Alcohol use: No   • Drug use: No       Review of Systems   Constitutional: Negative for chills and fever. HENT: Negative for ear pain and sore throat. Eyes: Negative for pain and visual disturbance. Respiratory: Negative for cough and shortness of breath. Cardiovascular: Negative for chest pain and palpitations. Gastrointestinal: Negative for abdominal pain and vomiting. Genitourinary: Negative for dysuria and hematuria. Musculoskeletal: Negative for arthralgias and back pain. Skin: Positive for rash. Negative for color change.         Left leg redness warmth   Neurological: Negative for seizures and syncope. All other systems reviewed and are negative. Physical Exam  Physical Exam  Vitals reviewed. Constitutional:       General: She is not in acute distress. Appearance: Normal appearance. She is not ill-appearing, toxic-appearing or diaphoretic. HENT:      Head: Normocephalic and atraumatic. Right Ear: External ear normal.      Left Ear: External ear normal.   Eyes:      General: No scleral icterus. Right eye: No discharge. Left eye: No discharge. Extraocular Movements: Extraocular movements intact. Conjunctiva/sclera: Conjunctivae normal.   Cardiovascular:      Rate and Rhythm: Normal rate. Pulses: Normal pulses. Pulmonary:      Effort: Pulmonary effort is normal. No respiratory distress. Breath sounds: No stridor. Musculoskeletal:         General: No deformity or signs of injury. Cervical back: Normal range of motion. No rigidity. Skin:     General: Skin is warm. Coloration: Skin is not jaundiced. Findings: Erythema present. No lesion or rash. Comments: There is warmth and an area of erythema in the left lower leg just distal to the calf region. Nontender. No evidence of fluctuant abscess there is normal dorsalis pedis pulse sensation and range of motion distally there is no reproducible calf tenderness. Neurological:      General: No focal deficit present. Mental Status: She is alert and oriented to person, place, and time. Mental status is at baseline. Gait: Gait normal.   Psychiatric:         Mood and Affect: Mood normal.         Thought Content:  Thought content normal.         Judgment: Judgment normal.         Vital Signs  ED Triage Vitals [07/31/23 1407]   Temperature Pulse Respirations Blood Pressure SpO2   (!) 97.3 °F (36.3 °C) 66 20 140/79 97 %      Temp Source Heart Rate Source Patient Position - Orthostatic VS BP Location FiO2 (%)   Temporal Monitor Sitting Right arm --      Pain Score       3           Vitals:    07/31/23 1407 07/31/23 1415   BP: 140/79 140/79   Pulse: 66 68   Patient Position - Orthostatic VS: Sitting          Visual Acuity      ED Medications  Medications - No data to display    Diagnostic Studies  Results Reviewed     None                 No orders to display              Procedures  Procedures         ED Course                               SBIRT 22yo+    Flowsheet Row Most Recent Value   Initial Alcohol Screen: US AUDIT-C     1. How often do you have a drink containing alcohol? 0 Filed at: 07/31/2023 1413   2. How many drinks containing alcohol do you have on a typical day you are drinking? 0 Filed at: 07/31/2023 1413   3b. FEMALE Any Age, or MALE 65+: How often do you have 4 or more drinks on one occassion? 0 Filed at: 07/31/2023 1413   Audit-C Score 0 Filed at: 07/31/2023 1413                    Medical Decision Making  60-year-old female here for evaluation of left leg redness and warmth this has been ongoing for about a week and a half has had a recent negative venous duplex study for thrombus. No chest pain shortness of breath history physical examination findings are consistent with a localized cellulitis. There is no tachycardia hypotension tachypnea or fever that would suggest sepsis. No chest pain or shortness of breath or tachycardia or hypoxia that would suggest pulmonary embolism. We will treat empirically with antibiotics. Risk  Prescription drug management. Disposition  Final diagnoses:   Left leg cellulitis     Time reflects when diagnosis was documented in both MDM as applicable and the Disposition within this note     Time User Action Codes Description Comment    7/31/2023  2:20 PM Mally Harvey Add [Q59.118] Left leg cellulitis       ED Disposition     ED Disposition   Discharge    Condition   Stable    Date/Time   Mon Jul 31, 2023  2:21 PM    [facility] discharge to home/self care.                Follow-up Information     Follow up With Specialties Details Why Contact Info    Bhavna Fry, DO Family Medicine Schedule an appointment as soon as possible for a visit  As needed [address] [place] [id number]  [phone]            Patient's Medications   Discharge Prescriptions    CEPHALEXIN (KEFLEX) 500 MG CAPSULE    Take 1 capsule (500 mg total) by mouth every 6 (six) hours for 7 days       Start Date: 7/31/2023 End Date: 8/7/2023       Order Dose: 500 mg       Quantity: 28 capsule    Refills: 0       No discharge procedures on file.     PDMP Review     None          ED Provider  Electronically Signed by           Jag Thompson PA-C  07/31/23 9824

## 2023-07-31 NOTE — DISCHARGE INSTRUCTIONS
Utilize heat compression wrap elevation and antibiotics keep a close eye on the skin infection if you note that it is spreading up into the upper leg or you note high fevers return to the emergency department for IV antibiotics.

## 2024-01-24 ENCOUNTER — APPOINTMENT (OUTPATIENT)
Dept: RADIOLOGY | Facility: CLINIC | Age: 49
End: 2024-01-24
Payer: COMMERCIAL

## 2024-01-24 DIAGNOSIS — M54.50 LUMBOSACRAL PAIN: ICD-10-CM

## 2024-01-24 PROCEDURE — 72100 X-RAY EXAM L-S SPINE 2/3 VWS: CPT

## 2025-06-21 ENCOUNTER — APPOINTMENT (EMERGENCY)
Dept: CT IMAGING | Facility: HOSPITAL | Age: 50
DRG: 694 | End: 2025-06-21
Payer: COMMERCIAL

## 2025-06-21 ENCOUNTER — HOSPITAL ENCOUNTER (INPATIENT)
Facility: HOSPITAL | Age: 50
LOS: 2 days | Discharge: HOME | DRG: 694 | End: 2025-06-23
Attending: EMERGENCY MEDICINE | Admitting: FAMILY MEDICINE
Payer: COMMERCIAL

## 2025-06-21 DIAGNOSIS — I50.33 ACUTE ON CHRONIC DIASTOLIC (CONGESTIVE) HEART FAILURE (HCC): ICD-10-CM

## 2025-06-21 DIAGNOSIS — E88.09 HYPOALBUMINEMIA: ICD-10-CM

## 2025-06-21 DIAGNOSIS — R60.0 BILATERAL LOWER EXTREMITY EDEMA: ICD-10-CM

## 2025-06-21 DIAGNOSIS — M79.89 LEG SWELLING: Primary | ICD-10-CM

## 2025-06-21 DIAGNOSIS — I82.442 ACUTE DEEP VEIN THROMBOSIS (DVT) OF TIBIAL VEIN OF LEFT LOWER EXTREMITY (HCC): ICD-10-CM

## 2025-06-21 LAB
ALBUMIN SERPL BCG-MCNC: 1.6 G/DL (ref 3.5–5)
ALP SERPL-CCNC: 81 U/L (ref 34–104)
ALT SERPL W P-5'-P-CCNC: 30 U/L (ref 7–52)
ANION GAP SERPL CALCULATED.3IONS-SCNC: 1 MMOL/L (ref 4–13)
APTT PPP: 29 SECONDS (ref 23–34)
AST SERPL W P-5'-P-CCNC: 26 U/L (ref 13–39)
BASOPHILS # BLD AUTO: 0.03 THOUSANDS/ÂΜL (ref 0–0.1)
BASOPHILS NFR BLD AUTO: 1 % (ref 0–1)
BILIRUB SERPL-MCNC: 0.35 MG/DL (ref 0.2–1)
BNP SERPL-MCNC: 224 PG/ML (ref 0–100)
BUN SERPL-MCNC: 15 MG/DL (ref 5–25)
CALCIUM ALBUM COR SERPL-MCNC: 8.9 MG/DL (ref 8.3–10.1)
CALCIUM SERPL-MCNC: 7 MG/DL (ref 8.4–10.2)
CARDIAC TROPONIN I PNL SERPL HS: 3 NG/L (ref ?–50)
CHLORIDE SERPL-SCNC: 113 MMOL/L (ref 96–108)
CO2 SERPL-SCNC: 27 MMOL/L (ref 21–32)
CREAT SERPL-MCNC: 1.02 MG/DL (ref 0.6–1.3)
EOSINOPHIL # BLD AUTO: 0.04 THOUSAND/ÂΜL (ref 0–0.61)
EOSINOPHIL NFR BLD AUTO: 1 % (ref 0–6)
ERYTHROCYTE [DISTWIDTH] IN BLOOD BY AUTOMATED COUNT: 14 % (ref 11.6–15.1)
GFR SERPL CREATININE-BSD FRML MDRD: 64 ML/MIN/1.73SQ M
GLUCOSE SERPL-MCNC: 76 MG/DL (ref 65–140)
HCT VFR BLD AUTO: 32.9 % (ref 34.8–46.1)
HGB BLD-MCNC: 10.4 G/DL (ref 11.5–15.4)
IMM GRANULOCYTES # BLD AUTO: 0.01 THOUSAND/UL (ref 0–0.2)
IMM GRANULOCYTES NFR BLD AUTO: 0 % (ref 0–2)
INR PPP: 1.17 (ref 0.85–1.19)
LYMPHOCYTES # BLD AUTO: 2.1 THOUSANDS/ÂΜL (ref 0.6–4.47)
LYMPHOCYTES NFR BLD AUTO: 37 % (ref 14–44)
MCH RBC QN AUTO: 30.7 PG (ref 26.8–34.3)
MCHC RBC AUTO-ENTMCNC: 31.6 G/DL (ref 31.4–37.4)
MCV RBC AUTO: 97 FL (ref 82–98)
MONOCYTES # BLD AUTO: 0.61 THOUSAND/ÂΜL (ref 0.17–1.22)
MONOCYTES NFR BLD AUTO: 11 % (ref 4–12)
NEUTROPHILS # BLD AUTO: 2.9 THOUSANDS/ÂΜL (ref 1.85–7.62)
NEUTS SEG NFR BLD AUTO: 50 % (ref 43–75)
NRBC BLD AUTO-RTO: 0 /100 WBCS
PLATELET # BLD AUTO: 204 THOUSANDS/UL (ref 149–390)
PMV BLD AUTO: 9.1 FL (ref 8.9–12.7)
POTASSIUM SERPL-SCNC: 4.1 MMOL/L (ref 3.5–5.3)
PROT SERPL-MCNC: 4.1 G/DL (ref 6.4–8.4)
PROTHROMBIN TIME: 15.3 SECONDS (ref 12.3–15)
RBC # BLD AUTO: 3.39 MILLION/UL (ref 3.81–5.12)
SODIUM SERPL-SCNC: 141 MMOL/L (ref 135–147)
WBC # BLD AUTO: 5.69 THOUSAND/UL (ref 4.31–10.16)

## 2025-06-21 PROCEDURE — 99284 EMERGENCY DEPT VISIT MOD MDM: CPT

## 2025-06-21 PROCEDURE — 83880 ASSAY OF NATRIURETIC PEPTIDE: CPT | Performed by: EMERGENCY MEDICINE

## 2025-06-21 PROCEDURE — 85730 THROMBOPLASTIN TIME PARTIAL: CPT | Performed by: EMERGENCY MEDICINE

## 2025-06-21 PROCEDURE — 99285 EMERGENCY DEPT VISIT HI MDM: CPT | Performed by: EMERGENCY MEDICINE

## 2025-06-21 PROCEDURE — 80053 COMPREHEN METABOLIC PANEL: CPT | Performed by: EMERGENCY MEDICINE

## 2025-06-21 PROCEDURE — 99223 1ST HOSP IP/OBS HIGH 75: CPT | Performed by: NURSE PRACTITIONER

## 2025-06-21 PROCEDURE — 85025 COMPLETE CBC W/AUTO DIFF WBC: CPT | Performed by: EMERGENCY MEDICINE

## 2025-06-21 PROCEDURE — 71275 CT ANGIOGRAPHY CHEST: CPT

## 2025-06-21 PROCEDURE — 93005 ELECTROCARDIOGRAM TRACING: CPT

## 2025-06-21 PROCEDURE — 96374 THER/PROPH/DIAG INJ IV PUSH: CPT

## 2025-06-21 PROCEDURE — 85610 PROTHROMBIN TIME: CPT | Performed by: EMERGENCY MEDICINE

## 2025-06-21 PROCEDURE — 84484 ASSAY OF TROPONIN QUANT: CPT | Performed by: EMERGENCY MEDICINE

## 2025-06-21 PROCEDURE — 74177 CT ABD & PELVIS W/CONTRAST: CPT

## 2025-06-21 PROCEDURE — 36415 COLL VENOUS BLD VENIPUNCTURE: CPT | Performed by: EMERGENCY MEDICINE

## 2025-06-21 RX ORDER — PANTOPRAZOLE SODIUM 20 MG/1
20 TABLET, DELAYED RELEASE ORAL
Status: DISCONTINUED | OUTPATIENT
Start: 2025-06-22 | End: 2025-06-23 | Stop reason: HOSPADM

## 2025-06-21 RX ORDER — ACETAMINOPHEN 500 MG
500 TABLET ORAL EVERY 6 HOURS PRN
COMMUNITY

## 2025-06-21 RX ORDER — FUROSEMIDE 10 MG/ML
40 INJECTION INTRAMUSCULAR; INTRAVENOUS ONCE
Status: COMPLETED | OUTPATIENT
Start: 2025-06-21 | End: 2025-06-21

## 2025-06-21 RX ORDER — BUPRENORPHINE AND NALOXONE 8; 2 MG/1; MG/1
8 FILM, SOLUBLE BUCCAL; SUBLINGUAL EVERY EVENING
Status: DISCONTINUED | OUTPATIENT
Start: 2025-06-22 | End: 2025-06-23 | Stop reason: HOSPADM

## 2025-06-21 RX ORDER — CHOLECALCIFEROL (VITAMIN D3) 50 MCG
2 TABLET ORAL ONCE
COMMUNITY

## 2025-06-21 RX ORDER — ALBUMIN (HUMAN) 12.5 G/50ML
12.5 SOLUTION INTRAVENOUS ONCE
Status: COMPLETED | OUTPATIENT
Start: 2025-06-21 | End: 2025-06-21

## 2025-06-21 RX ORDER — ENOXAPARIN SODIUM 100 MG/ML
40 INJECTION SUBCUTANEOUS DAILY
Status: DISCONTINUED | OUTPATIENT
Start: 2025-06-22 | End: 2025-06-23

## 2025-06-21 RX ORDER — ZINC SULFATE 50(220)MG
220 CAPSULE ORAL 2 TIMES DAILY
COMMUNITY

## 2025-06-21 RX ORDER — BUTALB/ACETAMINOPHEN/CAFFEINE 50-325-40
3 TABLET ORAL 2 TIMES DAILY
COMMUNITY

## 2025-06-21 RX ORDER — VITAMIN A 3000 MCG
10000 CAPSULE ORAL 2 TIMES DAILY
COMMUNITY

## 2025-06-21 RX ORDER — MULTIVIT WITH MINERALS/LUTEIN
400 TABLET ORAL 2 TIMES DAILY
COMMUNITY

## 2025-06-21 RX ORDER — IRON,CARBONYL/ASCORBIC ACID 65MG-125MG
1 TABLET ORAL DAILY
COMMUNITY

## 2025-06-21 RX ORDER — ACETAMINOPHEN 325 MG/1
650 TABLET ORAL EVERY 6 HOURS PRN
Status: DISCONTINUED | OUTPATIENT
Start: 2025-06-21 | End: 2025-06-23 | Stop reason: HOSPADM

## 2025-06-21 RX ORDER — ATORVASTATIN CALCIUM 40 MG/1
40 TABLET, FILM COATED ORAL DAILY
COMMUNITY

## 2025-06-21 RX ORDER — ASCORBIC ACID 125 MG
2 TABLET,CHEWABLE ORAL 2 TIMES DAILY
COMMUNITY

## 2025-06-21 RX ORDER — BUPRENORPHINE AND NALOXONE 8; 2 MG/1; MG/1
8 FILM, SOLUBLE BUCCAL; SUBLINGUAL 2 TIMES DAILY
COMMUNITY

## 2025-06-21 RX ORDER — FERROUS SULFATE 325(65) MG
325 TABLET ORAL DAILY
Status: DISCONTINUED | OUTPATIENT
Start: 2025-06-22 | End: 2025-06-23 | Stop reason: HOSPADM

## 2025-06-21 RX ORDER — ATORVASTATIN CALCIUM 40 MG/1
40 TABLET, FILM COATED ORAL DAILY
Status: DISCONTINUED | OUTPATIENT
Start: 2025-06-22 | End: 2025-06-23 | Stop reason: HOSPADM

## 2025-06-21 RX ADMIN — ALBUMIN (HUMAN) 12.5 G: 0.25 INJECTION, SOLUTION INTRAVENOUS at 20:06

## 2025-06-21 RX ADMIN — FUROSEMIDE 40 MG: 10 INJECTION, SOLUTION INTRAMUSCULAR; INTRAVENOUS at 17:08

## 2025-06-21 RX ADMIN — Medication 4000 UNITS: at 20:24

## 2025-06-21 RX ADMIN — IOHEXOL 100 ML: 350 INJECTION, SOLUTION INTRAVENOUS at 16:59

## 2025-06-21 NOTE — ED PROVIDER NOTES
Time reflects when diagnosis was documented in both MDM as applicable and the Disposition within this note       Time User Action Codes Description Comment    6/21/2025  6:50 PM Camden Andres Add [M79.89] Leg swelling     6/21/2025  8:16 PM Camden Andres Add [E88.09] Hypoalbuminemia           ED Disposition       ED Disposition   Admit    Condition   Stable    Date/Time   Sat Jun 21, 2025  6:49 PM    Comment   Case was discussed with hospital medicine and the patient's admission status was agreed to be Admission Status: inpatient status to the service of Dr. Serrano .               Assessment & Plan       Medical Decision Making  50-year-old female presents to the emergency department with complaint of bilateral lower extremity pitting edema, differential diagnosis includes fluid overload, DVT, cellulitis, congestive heart failure, OXANA, CKD, lower extremity ultrasound unavailable due to weekend, will perform CT PE study to evaluate for blood clot.  Will perform cardiopulmonary workup, BNP, and give patient IV Lasix.    Discussed case with hospital medicine, patient will be admitted for further evaluation.    Amount and/or Complexity of Data Reviewed  Labs: ordered.  Radiology: ordered.    Risk  Prescription drug management.  Decision regarding hospitalization.        ED Course as of 06/21/25 2016   Sat Jun 21, 2025   1849 IMPRESSION:     No acute findings in the chest, abdomen or pelvis. No pulmonary embolus.            Medications   furosemide (LASIX) injection 40 mg (40 mg Intravenous Given 6/21/25 1708)   iohexol (OMNIPAQUE) 350 MG/ML injection (MULTI-DOSE) 100 mL (100 mL Intravenous Given 6/21/25 1659)       ED Risk Strat Scores                    No data recorded        SBIRT 20yo+      Flowsheet Row Most Recent Value   Initial Alcohol Screen: US AUDIT-C     1. How often do you have a drink containing alcohol? 0 Filed at: 06/21/2025 7705   2. How many drinks containing alcohol do you have on a typical day you  "are drinking?  0 Filed at: 06/21/2025 1550   3b. FEMALE Any Age, or MALE 65+: How often do you have 4 or more drinks on one occassion? 0 Filed at: 06/21/2025 1557   Audit-C Score 0 Filed at: 06/21/2025 1559   MATTHEW: How many times in the past year have you...    Used an illegal drug or used a prescription medication for non-medical reasons? Never Filed at: 06/21/2025 2033                            History of Present Illness       Chief Complaint   Patient presents with    Leg Swelling     Patient presents to the ED with complaints of bilateral leg swelling that began a few days ago. The patient also reports \"feeling bloated.\"        Past Medical History[1]   Past Surgical History[2]   Family History[3]   Social History[4]   E-Cigarette/Vaping    E-Cigarette Use Never User       E-Cigarette/Vaping Substances      I have reviewed and agree with the history as documented.     50-year-old female with past medical history listed below presents to the emergency department with complaint of bilateral lower extremity pitting edema going on for the past couple of days.  Patient denies any chills, fevers, sweats, cough, mucus, chest pain, shortness of breath, GI or  complaints.  Patient states that she feels like the fluid is accumulating in her lower legs and in her stomach.  Patient has no history of being on a blood thinner, or congestive heart failure.          Review of Systems   Constitutional:  Negative for chills and fever.   HENT:  Negative for ear pain and sore throat.    Eyes:  Negative for pain and visual disturbance.   Respiratory:  Negative for cough and shortness of breath.    Cardiovascular:  Positive for leg swelling. Negative for chest pain and palpitations.   Gastrointestinal:  Negative for abdominal pain and vomiting.   Genitourinary:  Negative for dysuria and hematuria.   Musculoskeletal:  Negative for arthralgias and back pain.   Skin:  Negative for color change and rash.   Neurological:  Negative for " seizures and syncope.   All other systems reviewed and are negative.          Objective       ED Triage Vitals [06/21/25 1555]   Temperature Pulse Blood Pressure Respirations SpO2 Patient Position - Orthostatic VS   (!) 97 °F (36.1 °C) 77 127/71 16 97 % Sitting      Temp Source Heart Rate Source BP Location FiO2 (%) Pain Score    Temporal Monitor Left arm -- 4      Vitals      Date and Time Temp Pulse SpO2 Resp BP Pain Score FACES Pain Rating User   06/21/25 1930 97.3 °F (36.3 °C) 60 98 % 18 123/77 -- -- DII   06/21/25 1926 -- -- -- -- -- 3 -- AG   06/21/25 1900 -- 68 100 % 18 142/82 -- -- AD   06/21/25 1840 -- 56 98 % 14 117/75 -- -- PK   06/21/25 1730 -- 67 98 % 17 120/77 -- -- PK   06/21/25 1707 -- 63 97 % 19 116/66 -- -- PK   06/21/25 1645 -- 60 96 % 15 105/65 -- -- PK   06/21/25 1620 -- 65 94 % 19 118/71 -- -- PK   06/21/25 1600 -- 68 97 % 17 118/65 -- -- PK   06/21/25 1555 97 °F (36.1 °C) 77 97 % 16 127/71 4 -- RR            Physical Exam  Vitals and nursing note reviewed.   Constitutional:       General: She is not in acute distress.     Appearance: She is well-developed.   HENT:      Head: Normocephalic and atraumatic.     Eyes:      Conjunctiva/sclera: Conjunctivae normal.       Cardiovascular:      Rate and Rhythm: Normal rate and regular rhythm.   Pulmonary:      Effort: Pulmonary effort is normal. No respiratory distress.      Breath sounds: Normal breath sounds.   Abdominal:      Palpations: Abdomen is soft.      Tenderness: There is no abdominal tenderness.     Musculoskeletal:         General: No swelling.      Cervical back: Neck supple.      Right lower leg: Edema present.      Left lower leg: Edema present.     Skin:     General: Skin is warm and dry.      Capillary Refill: Capillary refill takes less than 2 seconds.     Neurological:      Mental Status: She is alert.     Psychiatric:         Mood and Affect: Mood normal.         Results Reviewed       Procedure Component Value Units Date/Time     APTT [[id number]]  (Normal) Collected: 06/21/25 1631    Lab Status: Final result Specimen: Blood from Arm, Left Updated: 06/21/25 1704     PTT 29 seconds     Protime-INR [[id number]]  (Abnormal) Collected: 06/21/25 1631    Lab Status: Final result Specimen: Blood from Arm, Left Updated: 06/21/25 1704     Protime 15.3 seconds      INR 1.17    Narrative:      INR Therapeutic Range    Indication                                             INR Range      Atrial Fibrillation                                               2.0-3.0  Hypercoagulable State                                    2.0.2.3  Left Ventricular Asist Device                            2.0-3.0  Mechanical Heart Valve                                  -    Aortic(with afib, MI, embolism, HF, LA enlargement,    and/or coagulopathy)                                     2.0-3.0 (2.5-3.5)     Mitral                                                             2.5-3.5  Prosthetic/Bioprosthetic Heart Valve               2.0-3.0  Venous thromboembolism (VTE: VT, PE        2.0-3.0    HS Troponin 0hr (reflex protocol) [[id number]]  (Normal) Collected: 06/21/25 1631    Lab Status: Final result Specimen: Blood from Arm, Left Updated: 06/21/25 1701     hs TnI 0hr 3 ng/L     B-Type Natriuretic Peptide(BNP) [[id number]]  (Abnormal) Collected: 06/21/25 1631    Lab Status: Final result Specimen: Blood from Arm, Left Updated: 06/21/25 1701      pg/mL     Comprehensive metabolic panel [[id number]]  (Abnormal) Collected: 06/21/25 1631    Lab Status: Final result Specimen: Blood from Arm, Left Updated: 06/21/25 1658     Sodium 141 mmol/L      Potassium 4.1 mmol/L      Chloride 113 mmol/L      CO2 27 mmol/L      ANION GAP 1 mmol/L      BUN 15 mg/dL      Creatinine 1.02 mg/dL      Glucose 76 mg/dL      Calcium 7.0 mg/dL      Corrected Calcium 8.9 mg/dL      AST 26 U/L      ALT 30 U/L      Alkaline Phosphatase 81 U/L      Total Protein 4.1 g/dL      Albumin 1.6 g/dL      Total  Bilirubin 0.35 mg/dL      eGFR 64 ml/min/1.73sq m     Narrative:      National Kid[employer] Disease Foundation guidelines for Chronic Kidney Disease (CKD):     Stage 1 with normal or high GFR (GFR > 90 mL/min/1.73 square meters)    Stage 2 Mild CKD (GFR = 60-89 mL/min/1.73 square meters)    Stage 3A Moderate CKD (GFR = 45-59 mL/min/1.73 square meters)    Stage 3B Moderate CKD (GFR = 30-44 mL/min/1.73 square meters)    Stage 4 Severe CKD (GFR = 15-29 mL/min/1.73 square meters)    Stage 5 End Stage CKD (GFR <15 mL/min/1.73 square meters)  Note: GFR calculation is accurate only with a steady state creatinine    CBC and differential [[id number]]  (Abnormal) Collected: 06/21/25 1631    Lab Status: Final result Specimen: Blood from Arm, Left Updated: 06/21/25 1638     WBC 5.69 Thousand/uL      RBC 3.39 Million/uL      Hemoglobin 10.4 g/dL      Hematocrit 32.9 %      MCV 97 fL      MCH 30.7 pg      MCHC 31.6 g/dL      RDW 14.0 %      MPV 9.1 fL      Platelets 204 Thousands/uL      nRBC 0 /100 WBCs      Segmented % 50 %      Immature Grans % 0 %      Lymphocytes % 37 %      Monocytes % 11 %      Eosinophils Relative 1 %      Basophils Relative 1 %      Absolute Neutrophils 2.90 Thousands/µL      Absolute Immature Grans 0.01 Thousand/uL      Absolute Lymphocytes 2.10 Thousands/µL      Absolute Monocytes 0.61 Thousand/µL      Eosinophils Absolute 0.04 Thousand/µL      Basophils Absolute 0.03 Thousands/µL             CT pe study w abdomen pelvis w contrast   Final Interpretation by Thony Reid MD (06/21 1847)      No acute findings in the chest, abdomen or pelvis. No pulmonary embolus.      Hepatomegaly.                  Computerized Assisted Algorithm (CAA) may have aided analysis of applicable images.      Workstation performed: [id number]          VAS VENOUS DUPLEX - LOWER LIMB BILATERAL    (Results Pending)       Procedures    ED Medication and Procedure Management   Prior to Admission Medications   Prescriptions Last  Dose Informant Patient Reported? Taking?   Cholecalciferol 50 MCG (2000 UT) TABS 6/21/2025 Morning Self Yes Yes   Sig: Take 2 tablets by mouth 1 (one) time   Copper Gluconate (COPPER CAPS PO) Past Month Self Yes Yes   Sig: Take 2 mg by mouth 1 (one) time TAKEN DAILY AT NOON   Iron-Vitamin C  MG TABS 6/21/2025 Morning Self Yes Yes   Sig: Take 1 tablet by mouth in the morning.   Menaquinone-7 (Vitamin K2) 100 MCG CAPS 6/21/2025 Morning Self Yes Yes   Sig: Take 2 capsules by mouth in the morning and 2 capsules before bedtime.   Multiple Vitamins-Minerals (WOMENS MULTIVITAMIN PO) 6/21/2025 Morning Self Yes Yes   Sig: Take 1 tablet by mouth in the morning and 1 tablet before bedtime.   OMEPRAZOLE PO 6/21/2025 Morning Self Yes Yes   Sig: Take 20 mg by mouth in the morning and 20 mg before bedtime.   acetaminophen (TYLENOL) 500 mg tablet 6/21/2025 Morning  Yes Yes   Sig: Take 500 mg by mouth every 6 (six) hours as needed for mild pain   atorvastatin (LIPITOR) 40 mg tablet 6/21/2025 Morning  Yes Yes   Sig: Take 40 mg by mouth in the morning.   buprenorphine-naloxone (Suboxone) 8-2 mg 6/21/2025 Morning Self Yes Yes   Sig: Place 8 mg under the tongue in the morning and 8 mg in the evening. 1.5 films in AM  1 film at night.   calcium citrate-vitamin D 315 mg-5 mcg tablet 6/21/2025 Morning Self Yes Yes   Sig: Take 3 tablets by mouth in the morning and 3 tablets in the evening.   vitamin A 3 MG (55498 UT) capsule 6/21/2025 Morning Self Yes Yes   Sig: Take 10,000 Units by mouth in the morning and 10,000 Units before bedtime.   vitamin E, tocopherol, 400 units capsule 6/21/2025 Morning Self Yes Yes   Sig: Take 400 Units by mouth 2 (two) times a day 2 caps in the AM, 1 in Evening   zinc sulfate (ZINCATE) 220 mg capsule 6/21/2025 Morning Self Yes Yes   Sig: Take 220 mg by mouth in the morning and 220 mg in the evening.      Facility-Administered Medications: None     Current Discharge Medication List        CONTINUE these  medications which have NOT CHANGED    Details   acetaminophen (TYLENOL) 500 mg tablet Take 500 mg by mouth every 6 (six) hours as needed for mild pain      atorvastatin (LIPITOR) 40 mg tablet Take 40 mg by mouth in the morning.      buprenorphine-naloxone (Suboxone) 8-2 mg Place 8 mg under the tongue in the morning and 8 mg in the evening. 1.5 films in AM  1 film at night.      calcium citrate-vitamin D 315 mg-5 mcg tablet Take 3 tablets by mouth in the morning and 3 tablets in the evening.      Cholecalciferol 50 MCG (2000 UT) TABS Take 2 tablets by mouth 1 (one) time      Copper Gluconate (COPPER CAPS PO) Take 2 mg by mouth 1 (one) time TAKEN DAILY AT NOON      Iron-Vitamin C  MG TABS Take 1 tablet by mouth in the morning.      Menaquinone-7 (Vitamin K2) 100 MCG CAPS Take 2 capsules by mouth in the morning and 2 capsules before bedtime.      Multiple Vitamins-Minerals (WOMENS MULTIVITAMIN PO) Take 1 tablet by mouth in the morning and 1 tablet before bedtime.      OMEPRAZOLE PO Take 20 mg by mouth in the morning and 20 mg before bedtime.      vitamin A 3 MG (00009 UT) capsule Take 10,000 Units by mouth in the morning and 10,000 Units before bedtime.      vitamin E, tocopherol, 400 units capsule Take 400 Units by mouth 2 (two) times a day 2 caps in the AM, 1 in Evening      zinc sulfate (ZINCATE) 220 mg capsule Take 220 mg by mouth in the morning and 220 mg in the evening.           No discharge procedures on file.  ED SEPSIS DOCUMENTATION   Time reflects when diagnosis was documented in both MDM as applicable and the Disposition within this note       Time User Action Codes Description Comment    6/21/2025  6:50 PM Camden Andres [M79.89] Leg swelling     6/21/2025  8:16 PM Camden Andres [E88.09] Hypoalbuminemia                    Camden Andres DO  06/21/25 1850         [1]   Past Medical History:  Diagnosis Date    Cellulitis     COPD (chronic obstructive pulmonary disease) (HCC)    [2]    Past Surgical History:  Procedure Laterality Date    GASTRECTOMY SLEEVE LAPAROSCOPIC      HERNIA REPAIR     [3] No family history on file.  [4]   Social History  Tobacco Use    Smoking status: Former    Smokeless tobacco: Never   Vaping Use    Vaping status: Never Used   Substance Use Topics    Alcohol use: No    Drug use: No        Camden Andres DO  06/21/25 2017

## 2025-06-21 NOTE — ED NOTES
Per provider, okay to wait until BNP results to administer lasix.     Jayy Javier  06/21/25 5174

## 2025-06-22 PROBLEM — E88.09 HYPOALBUMINEMIA DUE TO PROTEIN-CALORIE MALNUTRITION (HCC): Status: ACTIVE | Noted: 2025-06-22

## 2025-06-22 PROBLEM — I50.30 DIASTOLIC CHF (HCC): Status: ACTIVE | Noted: 2025-06-22

## 2025-06-22 PROBLEM — E46 HYPOALBUMINEMIA DUE TO PROTEIN-CALORIE MALNUTRITION (HCC): Status: ACTIVE | Noted: 2025-06-22

## 2025-06-22 PROBLEM — I50.33 ACUTE ON CHRONIC DIASTOLIC (CONGESTIVE) HEART FAILURE (HCC): Status: ACTIVE | Noted: 2025-06-22

## 2025-06-22 PROBLEM — F11.20 OPIOID DEPENDENCE ON AGONIST THERAPY (HCC): Status: ACTIVE | Noted: 2025-06-22

## 2025-06-22 LAB
ALBUMIN SERPL BCG-MCNC: 1.6 G/DL (ref 3.5–5)
ALP SERPL-CCNC: 72 U/L (ref 34–104)
ALT SERPL W P-5'-P-CCNC: 25 U/L (ref 7–52)
ANION GAP SERPL CALCULATED.3IONS-SCNC: 0 MMOL/L (ref 4–13)
AST SERPL W P-5'-P-CCNC: 19 U/L (ref 13–39)
BILIRUB SERPL-MCNC: 0.38 MG/DL (ref 0.2–1)
BUN SERPL-MCNC: 13 MG/DL (ref 5–25)
CALCIUM ALBUM COR SERPL-MCNC: 8.9 MG/DL (ref 8.3–10.1)
CALCIUM SERPL-MCNC: 7 MG/DL (ref 8.4–10.2)
CHLORIDE SERPL-SCNC: 111 MMOL/L (ref 96–108)
CO2 SERPL-SCNC: 31 MMOL/L (ref 21–32)
CREAT SERPL-MCNC: 0.83 MG/DL (ref 0.6–1.3)
ERYTHROCYTE [DISTWIDTH] IN BLOOD BY AUTOMATED COUNT: 14 % (ref 11.6–15.1)
GFR SERPL CREATININE-BSD FRML MDRD: 83 ML/MIN/1.73SQ M
GLUCOSE SERPL-MCNC: 71 MG/DL (ref 65–140)
HCT VFR BLD AUTO: 30.7 % (ref 34.8–46.1)
HGB BLD-MCNC: 9.9 G/DL (ref 11.5–15.4)
MAGNESIUM SERPL-MCNC: 1.6 MG/DL (ref 1.9–2.7)
MCH RBC QN AUTO: 30.9 PG (ref 26.8–34.3)
MCHC RBC AUTO-ENTMCNC: 32.2 G/DL (ref 31.4–37.4)
MCV RBC AUTO: 96 FL (ref 82–98)
PHOSPHATE SERPL-MCNC: 4.3 MG/DL (ref 2.7–4.5)
PLATELET # BLD AUTO: 183 THOUSANDS/UL (ref 149–390)
PMV BLD AUTO: 9.1 FL (ref 8.9–12.7)
POTASSIUM SERPL-SCNC: 3.9 MMOL/L (ref 3.5–5.3)
PROT SERPL-MCNC: 3.8 G/DL (ref 6.4–8.4)
RBC # BLD AUTO: 3.2 MILLION/UL (ref 3.81–5.12)
SODIUM SERPL-SCNC: 142 MMOL/L (ref 135–147)
TSH SERPL DL<=0.05 MIU/L-ACNC: 1.55 UIU/ML (ref 0.45–4.5)
WBC # BLD AUTO: 4.45 THOUSAND/UL (ref 4.31–10.16)

## 2025-06-22 PROCEDURE — 99232 SBSQ HOSP IP/OBS MODERATE 35: CPT | Performed by: FAMILY MEDICINE

## 2025-06-22 PROCEDURE — 80053 COMPREHEN METABOLIC PANEL: CPT | Performed by: NURSE PRACTITIONER

## 2025-06-22 PROCEDURE — 83735 ASSAY OF MAGNESIUM: CPT | Performed by: NURSE PRACTITIONER

## 2025-06-22 PROCEDURE — 84100 ASSAY OF PHOSPHORUS: CPT | Performed by: NURSE PRACTITIONER

## 2025-06-22 PROCEDURE — 84443 ASSAY THYROID STIM HORMONE: CPT | Performed by: NURSE PRACTITIONER

## 2025-06-22 PROCEDURE — 85027 COMPLETE CBC AUTOMATED: CPT | Performed by: NURSE PRACTITIONER

## 2025-06-22 RX ORDER — CALCIUM CARBONATE 500(1250)
1 TABLET ORAL 2 TIMES DAILY WITH MEALS
Status: DISCONTINUED | OUTPATIENT
Start: 2025-06-22 | End: 2025-06-23

## 2025-06-22 RX ORDER — FUROSEMIDE 10 MG/ML
40 INJECTION INTRAMUSCULAR; INTRAVENOUS
Status: DISCONTINUED | OUTPATIENT
Start: 2025-06-22 | End: 2025-06-23

## 2025-06-22 RX ORDER — MAGNESIUM SULFATE HEPTAHYDRATE 40 MG/ML
2 INJECTION, SOLUTION INTRAVENOUS ONCE
Status: COMPLETED | OUTPATIENT
Start: 2025-06-22 | End: 2025-06-22

## 2025-06-22 RX ADMIN — PANTOPRAZOLE SODIUM 20 MG: 20 TABLET, DELAYED RELEASE ORAL at 16:10

## 2025-06-22 RX ADMIN — ENOXAPARIN SODIUM 40 MG: 40 INJECTION SUBCUTANEOUS at 09:44

## 2025-06-22 RX ADMIN — ATORVASTATIN CALCIUM 40 MG: 40 TABLET, FILM COATED ORAL at 09:44

## 2025-06-22 RX ADMIN — FUROSEMIDE 40 MG: 10 INJECTION, SOLUTION INTRAMUSCULAR; INTRAVENOUS at 16:10

## 2025-06-22 RX ADMIN — BUPRENORPHINE AND NALOXONE 8 MG: 8; 2 FILM BUCCAL; SUBLINGUAL at 16:18

## 2025-06-22 RX ADMIN — MAGNESIUM SULFATE HEPTAHYDRATE 2 G: 40 INJECTION, SOLUTION INTRAVENOUS at 13:56

## 2025-06-22 RX ADMIN — FUROSEMIDE 40 MG: 10 INJECTION, SOLUTION INTRAMUSCULAR; INTRAVENOUS at 09:44

## 2025-06-22 RX ADMIN — MAGNESIUM SULFATE HEPTAHYDRATE 2 G: 40 INJECTION, SOLUTION INTRAVENOUS at 09:44

## 2025-06-22 RX ADMIN — CALCIUM 1 TABLET: 500 TABLET ORAL at 16:10

## 2025-06-22 RX ADMIN — BUPRENORPHINE AND NALOXONE 12 MG: 8; 2 FILM BUCCAL; SUBLINGUAL at 09:42

## 2025-06-22 RX ADMIN — FERROUS SULFATE TAB 325 MG (65 MG ELEMENTAL FE) 325 MG: 325 (65 FE) TAB at 09:44

## 2025-06-22 RX ADMIN — PANTOPRAZOLE SODIUM 20 MG: 20 TABLET, DELAYED RELEASE ORAL at 06:02

## 2025-06-22 NOTE — PLAN OF CARE
Problem: PAIN - ADULT  Goal: Verbalizes/displays adequate comfort level or baseline comfort level  Description: Interventions:  - Encourage patient to monitor pain and request assistance  - Assess pain using appropriate pain scale  - Administer analgesics as ordered based on type and severity of pain and evaluate response  - Implement non-pharmacological measures as appropriate and evaluate response  - Consider cultural and social influences on pain and pain management  - Notify physician/advanced practitioner if interventions unsuccessful or patient reports new pain  - Educate patient/family on pain management process including their role and importance of  reporting pain   - Provide non-pharmacologic/complimentary pain relief interventions  Outcome: Progressing     Problem: INFECTION - ADULT  Goal: Absence or prevention of progression during hospitalization  Description: INTERVENTIONS:  - Assess and monitor for signs and symptoms of infection  - Monitor lab/diagnostic results  - Monitor all insertion sites, i.e. indwelling lines, tubes, and drains  - Monitor endotracheal if appropriate and nasal secretions for changes in amount and color  - [place] appropriate cooling/warming therapies per order  - Administer medications as ordered  - Instruct and encourage patient and family to use good hand hygiene technique  - Identify and instruct in appropriate isolation precautions for identified infection/condition  Outcome: Progressing  Goal: Absence of fever/infection during neutropenic period  Description: INTERVENTIONS:  - Monitor WBC  - Perform strict hand hygiene  - Limit to healthy visitors only  - No plants, dried, fresh or silk flowers with bradley in patient room  Outcome: Progressing     Problem: SAFETY ADULT  Goal: Patient will remain free of falls  Description: INTERVENTIONS:  - Educate patient/family on patient safety including physical limitations  - Instruct patient to call for assistance with activity   -  Consider consulting OT/PT to assist with strengthening/mobility based on AM PAC & JH-HLM score  - Consult OT/PT to assist with strengthening/mobility   - Keep Call bell within reach  - Keep bed low and locked with side rails adjusted as appropriate  - Keep care items and personal belongings within reach  - Initiate and maintain comfort rounds  - Make Fall Risk Sign visible to staff  - Offer Toileting every 2 Hours, in advance of need  - Apply yellow socks and bracelet for high fall risk patients  - Consider moving patient to room near nurses station  Outcome: Progressing  Goal: Maintain or return to baseline ADL function  Description: INTERVENTIONS:  -  Assess patient's ability to carry out ADLs; assess patient's baseline for ADL function and identify physical deficits which impact ability to perform ADLs (bathing, care of mouth/teeth, toileting, grooming, dressing, etc.)  - Assess/evaluate cause of self-care deficits   - Assess range of motion  - Assess patient's mobility; develop plan if impaired  - Assess patient's need for assistive devices and provide as appropriate  - Encourage maximum independence but intervene and supervise when necessary  - Involve family in performance of ADLs  - Assess for home care needs following discharge   - Consider OT consult to assist with ADL evaluation and planning for discharge  - Provide patient education as appropriate  - Monitor functional capacity and physical performance, use of AM PAC & JH-HLM   - Monitor gait, balance and fatigue with ambulation    Outcome: Progressing  Goal: Maintains/Returns to pre admission functional level  Description: INTERVENTIONS:  - Perform AM-PAC 6 Click Basic Mobility/ Daily Activity assessment daily.  - Set and communicate daily mobility goal to care team and patient/family/caregiver.   - Collaborate with rehabilitation services on mobility goals if consulted  - Perform Range of Motion 3 times a day.  - Ambulate patient 3 times a day  - Out of  bed to chair 3 times a day   - Out of bed for meals 3 times a day  - Out of bed for toileting  - Record patient progress and toleration of activity level   Outcome: Progressing     Problem: DISCHARGE PLANNING  Goal: Discharge to home or other facility with appropriate resources  Description: INTERVENTIONS:  - Identify barriers to discharge w/patient and caregiver  - Arrange for needed discharge resources and transportation as appropriate  - Identify discharge learning needs (meds, wound care, etc.)  - Arrange for interpretive services to assist at discharge as needed  - Refer to Case Management Department for coordinating discharge planning if the patient needs post-hospital services based on physician/advanced practitioner order or complex needs related to functional status, cognitive ability, or social support system  Outcome: Progressing     Problem: Knowledge Deficit  Goal: Patient/family/caregiver demonstrates understanding of disease process, treatment plan, medications, and discharge instructions  Description: Complete learning assessment and assess knowledge base.  Interventions:  - Provide teaching at level of understanding  - Provide teaching via preferred learning methods  Outcome: Progressing     Problem: METABOLIC, FLUID AND ELECTROLYTES - ADULT  Goal: Electrolytes maintained within normal limits  Description: INTERVENTIONS:  - Monitor labs and assess patient for signs and symptoms of electrolyte imbalances  - Administer electrolyte replacement as ordered  - Monitor response to electrolyte replacements, including repeat lab results as appropriate  - Instruct patient on fluid and nutrition as appropriate  Outcome: Progressing  Goal: Fluid balance maintained  Description: INTERVENTIONS:  - Monitor labs   - Monitor I/O and WT  - Instruct patient on fluid and nutrition as appropriate  - Assess for signs & symptoms of volume excess or deficit  Outcome: Progressing     Problem: HEMATOLOGIC - ADULT  Goal:  Maintains hematologic stability  Description: INTERVENTIONS  - Assess for signs and symptoms of bleeding or hemorrhage  - Monitor labs  - Administer supportive blood products/factors as ordered and appropriate  Outcome: Progressing

## 2025-06-22 NOTE — PLAN OF CARE
Problem: METABOLIC, FLUID AND ELECTROLYTES - ADULT  Goal: Electrolytes maintained within normal limits  Description: INTERVENTIONS:  - Monitor labs and assess patient for signs and symptoms of electrolyte imbalances  - Administer electrolyte replacement as ordered magnesium  - Monitor response to electrolyte replacements, including repeat lab results as appropriate  - Instruct patient on fluid and nutrition as appropriate  Outcome: Progressing

## 2025-06-22 NOTE — ASSESSMENT & PLAN NOTE
Wt Readings from Last 3 Encounters:   06/21/25 95.2 kg (209 lb 14.1 oz)   07/31/23 99.8 kg (220 lb)   06/07/21 103 kg (226 lb 10.1 oz)     Review of PCP records patient carries a formal diagnosis of CHF but is not maintained on diuretics, had an echocardiogram in 2018 with a EF 55%  Prior to her gastric bypass she was maintained on Bumex 1 mg twice daily but since she has had a 200 pound weight loss no longer is maintained on any cardiac medications  Presents with lower extremity edema, will continue diuresis but this may be a component of hypoalbuminemia  2 g sodium diet  Intake output  Daily weights  Continue IV diuresis

## 2025-06-22 NOTE — ASSESSMENT & PLAN NOTE
Wt Readings from Last 3 Encounters:   06/22/25 94.3 kg (207 lb 14.3 oz)   07/31/23 99.8 kg (220 lb)   06/07/21 103 kg (226 lb 10.1 oz)     Review of PCP records patient carries a formal diagnosis of CHF but is not maintained on diuretics, had an echocardiogram in 2018 with a EF 55%  Prior to her gastric bypass she was maintained on Bumex 1 mg twice daily but since she has had a 200 pound weight loss no longer is maintained on any cardiac medications  Presents with lower extremity edema, will continue diuresis but this may be a component of hypoalbuminemia also concerning for venous insufficiency as patient noted to have a lot of varicose veins and previous history of wounds on the leg cellulitis and chronic lower extremity edema which is now worse  2 g sodium diet  Intake output  Daily weights  Continue IV diuresis.  2D echo ordered.

## 2025-06-22 NOTE — H&P
H&P - Hospitalist   Name: Rose Mary Villaseñor 50 y.o. female I MRN: [id number]  Unit/Bed#: MS 329Gina I Date of Admission: 6/21/2025   Date of Service: 6/22/2025 I Hospital Day: 1     Assessment & Plan  Bilateral lower extremity edema  Presents from home with 3+ pitting bilateral lower extremity edema that has been worsening over the past few weeks  Returned last week from a [place] vacation, traveled by air.  States her legs felt much better while they were in the salt water  Significant edema making her legs feel heavy and causing difficulty in ambulation  No evidence of cellulitis on lower extremities  Significant hypoalbuminemia with albumin level of 1.6 that patient states has been a problem since her gastric sleeve and she does follow with nutrition to increase her protein levels  Received IV furosemide in the ED, albumin x 1  Continue diuresis  Check lower extremity venous duplex, rule out DVT  Opioid dependence on agonist therapy (HCC)  Continue Suboxone  PDMP reviewed, no red flags  Acute on chronic diastolic (congestive) heart failure (HCC)  Wt Readings from Last 3 Encounters:   06/21/25 95.2 kg (209 lb 14.1 oz)   07/31/23 99.8 kg (220 lb)   06/07/21 103 kg (226 lb 10.1 oz)     Review of PCP records patient carries a formal diagnosis of CHF but is not maintained on diuretics, had an echocardiogram in 2018 with a EF 55%  Prior to her gastric bypass she was maintained on Bumex 1 mg twice daily but since she has had a 200 pound weight loss no longer is maintained on any cardiac medications  Presents with lower extremity edema, will continue diuresis but this may be a component of hypoalbuminemia  2 g sodium diet  Intake output  Daily weights  Continue IV diuresis  Hypoalbuminemia due to protein-calorie malnutrition (HCC)  History gastric bypass now with absorption all malnutrition.  Patient has worked with nutrition consultation in the past and is trying to incorporate more protein into her diet.  Albumin  1.6  Significant lower extremity edema  Continue to follow-up outpatient with bariatric services at [facility]  Reports compliance with supplements including calcium, vitamin D, copper, iron, vitamin C, vitamin K, MVI, vitamin A, vitamin D, zinc  Body mass index is 39.66 kg/m².         VTE Pharmacologic Prophylaxis:   High Risk (Score >/= 5) - Pharmacological DVT Prophylaxis Ordered: enoxaparin (Lovenox). Sequential Compression Devices Ordered.  Code Status: Level 1 - Full Code   Discussion with family: Updated  () at bedside.    Anticipated Length of Stay: Patient will be admitted on an inpatient basis with an anticipated length of stay of greater than 2 midnights secondary to lower extremity edema.    History of Present Illness   Chief Complaint: Lower extremity edema    Rose Mary Villaseñor is a 50 y.o. female with a PMH of morbid obesity now status post gastric bypass unfortunately with malabsorption syndrome following with bariatrics at [facility] maintained on multiple supplementations and following with nutritionist regarding protein malnutrition who presents with acute worsening over the past 2 weeks of her bilateral lower extremity edema.  Returned from a [place] vacation with worsening swelling and heaviness in her bilateral lower extremities right greater than left which she states is always larger.  History of cellulitis in her right lower extremity and she states this does not feel like that is the leg is not actually painful and she does not have flulike symptoms.  She appears to have venous stasis dermatitis on bilateral lower extremities along with some lymphedema.  History of diastolic CHF prior to gastric bypass she was maintained on Bumex but since her 200 pound weight loss she is no longer maintained on any cardiac medications.  Received IV furosemide and presented to the medical service for further evaluation and treatment    Review of Systems   Constitutional:  Negative for  chills and fever.   HENT:  Negative for ear pain and sore throat.    Eyes:  Negative for pain and visual disturbance.   Respiratory:  Negative for cough and shortness of breath.    Cardiovascular:  Positive for leg swelling. Negative for chest pain and palpitations.   Gastrointestinal:  Negative for abdominal pain and vomiting.   Genitourinary:  Negative for dysuria and hematuria.   Musculoskeletal:  Positive for gait problem. Negative for arthralgias and back pain.   Skin:  Negative for color change and rash.   Neurological:  Negative for seizures and syncope.   All other systems reviewed and are negative.      Historical Information   Past Medical History[1]  Past Surgical History[2]  Social History[3]  E-Cigarette/Vaping    E-Cigarette Use Never User      E-Cigarette/Vaping Substances     Family History[4]  Social History:  Marital Status: Unknown   Patient Pre-hospital Living Situation: Home  Patient Pre-hospital Level of Mobility: walks  Patient Pre-hospital Diet Restrictions: None    Meds/Allergies   I have reviewed home medications with patient personally.  Prior to Admission medications    Medication Sig Start Date End Date Taking? Authorizing Provider   acetaminophen (TYLENOL) 500 mg tablet Take 500 mg by mouth every 6 (six) hours as needed for mild pain   Yes Historical Provider, MD   atorvastatin (LIPITOR) 40 mg tablet Take 40 mg by mouth in the morning.   Yes Historical Provider, MD   buprenorphine-naloxone (Suboxone) 8-2 mg Place 8 mg under the tongue in the morning and 8 mg in the evening. 1.5 films in AM  1 film at night.   Yes Historical Provider, MD   calcium citrate-vitamin D 315 mg-5 mcg tablet Take 3 tablets by mouth in the morning and 3 tablets in the evening.   Yes Historical Provider, MD   Cholecalciferol 50 MCG (2000 UT) TABS Take 2 tablets by mouth 1 (one) time   Yes Historical Provider, MD   Copper Gluconate (COPPER CAPS PO) Take 2 mg by mouth 1 (one) time TAKEN DAILY AT NOON   Yes  Historical Provider, MD   Iron-Vitamin C  MG TABS Take 1 tablet by mouth in the morning.   Yes Historical Provider, MD   Menaquinone-7 (Vitamin K2) 100 MCG CAPS Take 2 capsules by mouth in the morning and 2 capsules before bedtime.   Yes Historical Provider, MD   Multiple Vitamins-Minerals (WOMENS MULTIVITAMIN PO) Take 1 tablet by mouth in the morning and 1 tablet before bedtime.   Yes Historical Provider, MD   OMEPRAZOLE PO Take 20 mg by mouth in the morning and 20 mg before bedtime.   Yes Historical Provider, MD   vitamin A 3 MG (23960 UT) capsule Take 10,000 Units by mouth in the morning and 10,000 Units before bedtime.   Yes Historical Provider, MD   vitamin E, tocopherol, 400 units capsule Take 400 Units by mouth 2 (two) times a day 2 caps in the AM, 1 in Evening   Yes Historical Provider, MD   zinc sulfate (ZINCATE) 220 mg capsule Take 220 mg by mouth in the morning and 220 mg in the evening.   Yes Historical Provider, MD     No Known Allergies    Objective :  Temp:  [97 °F (36.1 °C)-97.7 °F (36.5 °C)] 97.7 °F (36.5 °C)  HR:  [56-77] 67  BP: (105-142)/(65-82) 107/65  Resp:  [14-19] 18  SpO2:  [94 %-100 %] 98 %  O2 Device: None (Room air)    Physical Exam  Vitals and nursing note reviewed.   Constitutional:       General: She is not in acute distress.     Appearance: She is well-developed. She is obese.   HENT:      Head: Normocephalic and atraumatic.      Mouth/Throat:      Mouth: Mucous membranes are moist.     Eyes:      Conjunctiva/sclera: Conjunctivae normal.       Cardiovascular:      Rate and Rhythm: Normal rate and regular rhythm.      Heart sounds: No murmur heard.  Pulmonary:      Effort: Pulmonary effort is normal. No respiratory distress.      Breath sounds: Normal breath sounds.   Abdominal:      Palpations: Abdomen is soft.      Tenderness: There is no abdominal tenderness.     Musculoskeletal:         General: Swelling present.      Cervical back: Neck supple.      Right lower leg: Edema  present.      Left lower leg: Edema present.      Comments: +3 pitting edema bilateral lower extremities     Skin:     General: Skin is warm and dry.      Capillary Refill: Capillary refill takes less than 2 seconds.      Comments: Venous stasis dermatitis bilateral lower extremities     Neurological:      General: No focal deficit present.      Mental Status: She is alert and oriented to person, place, and time.     Psychiatric:         Mood and Affect: Mood normal.         Behavior: Behavior normal.         Lab Results: I have reviewed the following results:  Results from last 7 days   Lab Units 06/22/25  0458 06/21/25  1631   WBC Thousand/uL 4.45 5.69   HEMOGLOBIN g/dL 9.9* 10.4*   HEMATOCRIT % 30.7* 32.9*   PLATELETS Thousands/uL 183 204   SEGS PCT %  --  50   LYMPHO PCT %  --  37   MONO PCT %  --  11   EOS PCT %  --  1     Results from last 7 days   Lab Units 06/21/25  1631   SODIUM mmol/L 141   POTASSIUM mmol/L 4.1   CHLORIDE mmol/L 113*   CO2 mmol/L 27   BUN mg/dL 15   CREATININE mg/dL 1.02   ANION GAP mmol/L 1*   CALCIUM mg/dL 7.0*   ALBUMIN g/dL 1.6*   TOTAL BILIRUBIN mg/dL 0.35   ALK PHOS U/L 81   ALT U/L 30   AST U/L 26   GLUCOSE RANDOM mg/dL 76     Results from last 7 days   Lab Units 06/21/25  1631   INR  1.17         Lab Results   Component Value Date    HGBA1C 4.8 12/04/2024    HGBA1C 4.5 11/13/2023    HGBA1C 5 01/25/2023           Imaging Results Review: I reviewed radiology reports from this admission including: CT chest.  Other Study Results Review: EKG was reviewed.     ** Please Note: This note has been constructed using a voice recognition system. **         [1]   Past Medical History:  Diagnosis Date    Cellulitis     COPD (chronic obstructive pulmonary disease) (HCC)    [2]   Past Surgical History:  Procedure Laterality Date    GASTRECTOMY SLEEVE LAPAROSCOPIC      HERNIA REPAIR     [3]   Social History  Tobacco Use    Smoking status: Former    Smokeless tobacco: Never   Vaping Use    Vaping  status: Never Used   Substance and Sexual Activity    Alcohol use: No    Drug use: No   [4] No family history on file.

## 2025-06-22 NOTE — PROGRESS NOTES
Progress Note - Hospitalist   Name: Rose Mary Villaseñor 50 y.o. female I MRN: [id number]  Unit/Bed#: -01 I Date of Admission: 6/21/2025   Date of Service: 6/22/2025 I Hospital Day: 1    Assessment & Plan  Bilateral lower extremity edema  Presents from home with 3+ pitting bilateral lower extremity edema that has been worsening over the past few weeks  Returned last week from a [place] vacation, traveled by air.  States her legs felt much better while they were in the salt water  Significant edema making her legs feel heavy and causing difficulty in ambulation  No evidence of cellulitis on lower extremities  Significant hypoalbuminemia with albumin level of 1.6 that patient states has been a problem since her gastric sleeve and she does follow with nutrition to increase her protein levels  Received IV furosemide in the ED, albumin x 1  Continue diuresis  Check lower extremity venous duplex, rule out DVT  Opioid dependence on agonist therapy (HCC)  Continue Suboxone  PDMP reviewed, no red flags  Acute on chronic diastolic (congestive) heart failure (HCC)  Wt Readings from Last 3 Encounters:   06/22/25 94.3 kg (207 lb 14.3 oz)   07/31/23 99.8 kg (220 lb)   06/07/21 103 kg (226 lb 10.1 oz)     Review of PCP records patient carries a formal diagnosis of CHF but is not maintained on diuretics, had an echocardiogram in 2018 with a EF 55%  Prior to her gastric bypass she was maintained on Bumex 1 mg twice daily but since she has had a 200 pound weight loss no longer is maintained on any cardiac medications  Presents with lower extremity edema, will continue diuresis but this may be a component of hypoalbuminemia also concerning for venous insufficiency as patient noted to have a lot of varicose veins and previous history of wounds on the leg cellulitis and chronic lower extremity edema which is now worse  2 g sodium diet  Intake output  Daily weights  Continue IV diuresis.  2D echo ordered.  Hypoalbuminemia due to  protein-calorie malnutrition (HCC)  History gastric bypass now with absorption all malnutrition.  Patient has worked with nutrition consultation in the past and is trying to incorporate more protein into her diet.  Albumin 1.6  Significant lower extremity edema  Continue to follow-up outpatient with bariatric services at [facility]  Reports compliance with supplements including calcium, vitamin D, copper, iron, vitamin C, vitamin K, MVI, vitamin A, vitamin D, zinc  Body mass index is 39.28 kg/m².       VTE Pharmacologic Prophylaxis:   Moderate Risk (Score 3-4) - Pharmacological DVT Prophylaxis Ordered: enoxaparin (Lovenox).    Mobility:   Basic Mobility Inpatient Raw Score: 24  JH-HLM Goal: 8: Walk 250 feet or more  JH-HLM Achieved: 8: Walk 250 feet ot more  JH-HLM Goal achieved. Continue to encourage appropriate mobility.    Patient Centered Rounds: I performed bedside rounds with nursing staff today.   Discussions with Specialists or Other Care Team Provider: none    Education and Discussions with Family / Patient:     Current Length of Stay: 1 day(s)  Current Patient Status: Inpatient   Certification Statement: The patient will continue to require additional inpatient hospital stay due to bilateral lower extremity edema  Discharge Plan: Anticipate discharge tomorrow to home.    Code Status: Level 1 - Full Code    Subjective   Patient complains of lower extremity swelling she states in the past she has had cellulitis and other issues with her legs and swelling but now it is much worse.  Since starting Lasix her legs are starting to feel better    Objective :  Temp:  [97 °F (36.1 °C)-97.7 °F (36.5 °C)] 97.7 °F (36.5 °C)  HR:  [56-77] 64  BP: ()/(57-82) 111/67  Resp:  [14-19] 16  SpO2:  [94 %-100 %] 96 %  O2 Device: None (Room air)    Body mass index is 39.28 kg/m².     Input and Output Summary (last 24 hours):     Intake/Output Summary (Last 24 hours) at 6/22/2025 1321  Last data filed at 6/22/2025 1205  Gross  per 24 hour   Intake 770 ml   Output 800 ml   Net -30 ml       Physical Exam  Vitals and nursing note reviewed.   Constitutional:       Appearance: Normal appearance.   HENT:      Head: Normocephalic and atraumatic.      Right Ear: External ear normal.      Left Ear: External ear normal.      Nose: Nose normal.      Mouth/Throat:      Pharynx: Oropharynx is clear.     Eyes:      Pupils: Pupils are equal, round, and reactive to light.       Cardiovascular:      Rate and Rhythm: Normal rate and regular rhythm.      Heart sounds: Normal heart sounds.   Pulmonary:      Effort: Pulmonary effort is normal.      Breath sounds: Normal breath sounds.   Abdominal:      General: Bowel sounds are normal.      Palpations: Abdomen is soft.      Tenderness: There is no abdominal tenderness.     Musculoskeletal:         General: Normal range of motion.      Cervical back: Normal range of motion and neck supple.      Right lower leg: Edema present.      Left lower leg: Edema present.     Skin:     General: Skin is warm and dry.      Capillary Refill: Capillary refill takes less than 2 seconds.     Neurological:      General: No focal deficit present.      Mental Status: She is alert and oriented to person, place, and time.     Psychiatric:         Mood and Affect: Mood normal.           Lines/Drains:              Lab Results: I have reviewed the following results:   Results from last 7 days   Lab Units 06/22/25  0458 06/21/25  1631   WBC Thousand/uL 4.45 5.69   HEMOGLOBIN g/dL 9.9* 10.4*   HEMATOCRIT % 30.7* 32.9*   PLATELETS Thousands/uL 183 204   SEGS PCT %  --  50   LYMPHO PCT %  --  37   MONO PCT %  --  11   EOS PCT %  --  1     Results from last 7 days   Lab Units 06/22/25  0458   SODIUM mmol/L 142   POTASSIUM mmol/L 3.9   CHLORIDE mmol/L 111*   CO2 mmol/L 31   BUN mg/dL 13   CREATININE mg/dL 0.83   ANION GAP mmol/L 0*   CALCIUM mg/dL 7.0*   ALBUMIN g/dL 1.6*   TOTAL BILIRUBIN mg/dL 0.38   ALK PHOS U/L 72   ALT U/L 25   AST U/L  19   GLUCOSE RANDOM mg/dL 71     Results from last 7 days   Lab Units 06/21/25  1631   INR  1.17                   Recent Cultures (last 7 days):         Imaging Results Review: I reviewed radiology reports from this admission including: CT chest.  Other Study Results Review: EKG was reviewed.     Last 24 Hours Medication List:     Current Facility-Administered Medications:     acetaminophen (TYLENOL) tablet 650 mg, Q6H PRN    atorvastatin (LIPITOR) tablet 40 mg, Daily    buprenorphine-naloxone (Suboxone) film 12 mg, Daily    buprenorphine-naloxone (Suboxone) film 8 mg, QPM    calcium carbonate (OYSTER SHELL,OSCAL) 500 mg tablet 1 tablet, BID With Meals    enoxaparin (LOVENOX) subcutaneous injection 40 mg, Daily    ferrous sulfate tablet 325 mg, Daily    furosemide (LASIX) injection 40 mg, BID (diuretic)    magnesium sulfate 2 g/50 mL IVPB (premix) 2 g, Once    pantoprazole (PROTONIX) EC tablet 20 mg, BID AC    Administrative Statements   Today, Patient Was Seen By: Marisa Carey MD      **Please Note: This note may have been constructed using a voice recognition system.**

## 2025-06-22 NOTE — ASSESSMENT & PLAN NOTE
Presents from home with 3+ pitting bilateral lower extremity edema that has been worsening over the past few weeks  Returned last week from a [place] vacation, traveled by air.  States her legs felt much better while they were in the salt water  Significant edema making her legs feel heavy and causing difficulty in ambulation  No evidence of cellulitis on lower extremities  Significant hypoalbuminemia with albumin level of 1.6 that patient states has been a problem since her gastric sleeve and she does follow with nutrition to increase her protein levels  Received IV furosemide in the ED, albumin x 1  Continue diuresis  Check lower extremity venous duplex, rule out DVT

## 2025-06-22 NOTE — ASSESSMENT & PLAN NOTE
History gastric bypass now with absorption all malnutrition.  Patient has worked with nutrition consultation in the past and is trying to incorporate more protein into her diet.  Albumin 1.6  Significant lower extremity edema  Continue to follow-up outpatient with bariatric services at [facility]  Reports compliance with supplements including calcium, vitamin D, copper, iron, vitamin C, vitamin K, MVI, vitamin A, vitamin D, zinc  Body mass index is 39.66 kg/m².

## 2025-06-23 ENCOUNTER — APPOINTMENT (INPATIENT)
Dept: NON INVASIVE DIAGNOSTICS | Facility: HOSPITAL | Age: 50
DRG: 694 | End: 2025-06-23
Payer: COMMERCIAL

## 2025-06-23 VITALS
HEIGHT: 61 IN | HEART RATE: 62 BPM | WEIGHT: 201 LBS | BODY MASS INDEX: 37.95 KG/M2 | RESPIRATION RATE: 18 BRPM | SYSTOLIC BLOOD PRESSURE: 117 MMHG | OXYGEN SATURATION: 97 % | DIASTOLIC BLOOD PRESSURE: 68 MMHG | TEMPERATURE: 97.3 F

## 2025-06-23 LAB
ANION GAP SERPL CALCULATED.3IONS-SCNC: 1 MMOL/L (ref 4–13)
ATRIAL RATE: 70 BPM
BSA FOR ECHO PROCEDURE: 1.89 M2
BUN SERPL-MCNC: 17 MG/DL (ref 5–25)
CA-I BLD-SCNC: 1.05 MMOL/L (ref 1.12–1.32)
CALCIUM SERPL-MCNC: 7 MG/DL (ref 8.4–10.2)
CHLORIDE SERPL-SCNC: 107 MMOL/L (ref 96–108)
CO2 SERPL-SCNC: 34 MMOL/L (ref 21–32)
CORTIS SERPL-MCNC: 5.5 UG/DL
CREAT SERPL-MCNC: 0.83 MG/DL (ref 0.6–1.3)
DOP CALC LVOT AREA: 2.27 CM2
DOP CALC LVOT DIAMETER: 1.7 CM
E WAVE DECELERATION TIME: 173 MS
E/A RATIO: 0.79
FRACTIONAL SHORTENING: 33 (ref 28–44)
GFR SERPL CREATININE-BSD FRML MDRD: 83 ML/MIN/1.73SQ M
GLUCOSE SERPL-MCNC: 70 MG/DL (ref 65–140)
INTERVENTRICULAR SEPTUM IN DIASTOLE (PARASTERNAL SHORT AXIS VIEW): 1.2 CM
INTERVENTRICULAR SEPTUM: 1.2 CM (ref 0.6–1.1)
LAAS-AP2: 17.4 CM2
LAAS-AP4: 18 CM2
LEFT ATRIUM AREA SYSTOLE SINGLE PLANE A4C: 17.7 CM2
LEFT ATRIUM SIZE: 3.1 CM
LEFT ATRIUM VOLUME (MOD BIPLANE): 52 ML
LEFT ATRIUM VOLUME INDEX (MOD BIPLANE): 27.4 ML/M2
LEFT INTERNAL DIMENSION IN SYSTOLE: 2.8 CM (ref 2.1–4)
LEFT VENTRICLE DIASTOLIC VOLUME (MOD BIPLANE): 80 ML
LEFT VENTRICLE DIASTOLIC VOLUME INDEX (MOD BIPLANE): 42.3 ML/M2
LEFT VENTRICLE SYSTOLIC VOLUME (MOD BIPLANE): 29 ML
LEFT VENTRICLE SYSTOLIC VOLUME INDEX (MOD BIPLANE): 15.3 ML/M2
LEFT VENTRICULAR INTERNAL DIMENSION IN DIASTOLE: 4.2 CM (ref 3.5–6)
LEFT VENTRICULAR POSTERIOR WALL IN END DIASTOLE: 1.3 CM
LEFT VENTRICULAR STROKE VOLUME: 50 ML
LV EF BIPLANE MOD: 64 %
LV EF US.2D.A4C+ESTIMATED: 63 %
LVSV (TEICH): 50 ML
MAGNESIUM SERPL-MCNC: 2 MG/DL (ref 1.9–2.7)
MV E'TISSUE VEL-LAT: 10 CM/S
MV E'TISSUE VEL-SEP: 8 CM/S
MV PEAK A VEL: 0.72 M/S
MV PEAK E VEL: 57 CM/S
MV STENOSIS PRESSURE HALF TIME: 50 MS
MV VALVE AREA P 1/2 METHOD: 4.4
P AXIS: 43 DEGREES
POTASSIUM SERPL-SCNC: 4.2 MMOL/L (ref 3.5–5.3)
PR INTERVAL: 148 MS
QRS AXIS: 0 DEGREES
QRSD INTERVAL: 88 MS
QT INTERVAL: 406 MS
QTC INTERVAL: 438 MS
RA PRESSURE ESTIMATED: 3 MMHG
RIGHT ATRIUM AREA SYSTOLE A4C: 18.5 CM2
RIGHT VENTRICLE ID DIMENSION: 4.2 CM
RV PSP: 19 MMHG
SL CV LEFT ATRIUM LENGTH A2C: 5 CM
SL CV LV EF: 64
SL CV PED ECHO LEFT VENTRICLE DIASTOLIC VOLUME (MOD BIPLANE) 2D: 79 ML
SL CV PED ECHO LEFT VENTRICLE SYSTOLIC VOLUME (MOD BIPLANE) 2D: 29 ML
SODIUM SERPL-SCNC: 142 MMOL/L (ref 135–147)
T WAVE AXIS: 28 DEGREES
TR MAX PG: 16 MMHG
TR PEAK VELOCITY: 2 M/S
TRICUSPID ANNULAR PLANE SYSTOLIC EXCURSION: 2.3 CM
TRICUSPID VALVE PEAK REGURGITATION VELOCITY: 2.02 M/S
VENTRICULAR RATE: 70 BPM

## 2025-06-23 PROCEDURE — 83735 ASSAY OF MAGNESIUM: CPT | Performed by: FAMILY MEDICINE

## 2025-06-23 PROCEDURE — 80048 BASIC METABOLIC PNL TOTAL CA: CPT | Performed by: FAMILY MEDICINE

## 2025-06-23 PROCEDURE — 99239 HOSP IP/OBS DSCHRG MGMT >30: CPT | Performed by: FAMILY MEDICINE

## 2025-06-23 PROCEDURE — 82330 ASSAY OF CALCIUM: CPT | Performed by: FAMILY MEDICINE

## 2025-06-23 PROCEDURE — 93306 TTE W/DOPPLER COMPLETE: CPT

## 2025-06-23 PROCEDURE — 93970 EXTREMITY STUDY: CPT | Performed by: SURGERY

## 2025-06-23 PROCEDURE — 93970 EXTREMITY STUDY: CPT

## 2025-06-23 PROCEDURE — 82533 TOTAL CORTISOL: CPT | Performed by: FAMILY MEDICINE

## 2025-06-23 RX ORDER — CALCIUM CARBONATE 500(1250)
1 TABLET ORAL
Status: DISCONTINUED | OUTPATIENT
Start: 2025-06-23 | End: 2025-06-23 | Stop reason: HOSPADM

## 2025-06-23 RX ORDER — MIDODRINE HYDROCHLORIDE 5 MG/1
5 TABLET ORAL
Qty: 60 TABLET | Refills: 0 | Status: SHIPPED | OUTPATIENT
Start: 2025-06-23 | End: 2025-07-23

## 2025-06-23 RX ORDER — MIDODRINE HYDROCHLORIDE 5 MG/1
5 TABLET ORAL
Status: DISCONTINUED | OUTPATIENT
Start: 2025-06-23 | End: 2025-06-23 | Stop reason: HOSPADM

## 2025-06-23 RX ORDER — FUROSEMIDE 20 MG/1
20 TABLET ORAL DAILY
Qty: 30 TABLET | Refills: 0 | Status: SHIPPED | OUTPATIENT
Start: 2025-06-23 | End: 2025-07-23

## 2025-06-23 RX ORDER — FUROSEMIDE 40 MG/1
40 TABLET ORAL DAILY
Status: DISCONTINUED | OUTPATIENT
Start: 2025-06-23 | End: 2025-06-23 | Stop reason: HOSPADM

## 2025-06-23 RX ORDER — CALCIUM GLUCONATE 20 MG/ML
1 INJECTION, SOLUTION INTRAVENOUS ONCE
Status: COMPLETED | OUTPATIENT
Start: 2025-06-23 | End: 2025-06-23

## 2025-06-23 RX ADMIN — CALCIUM 1 TABLET: 500 TABLET ORAL at 11:29

## 2025-06-23 RX ADMIN — MIDODRINE HYDROCHLORIDE 5 MG: 5 TABLET ORAL at 10:43

## 2025-06-23 RX ADMIN — BUPRENORPHINE AND NALOXONE 12 MG: 8; 2 FILM BUCCAL; SUBLINGUAL at 07:39

## 2025-06-23 RX ADMIN — ATORVASTATIN CALCIUM 40 MG: 40 TABLET, FILM COATED ORAL at 07:36

## 2025-06-23 RX ADMIN — CALCIUM 1 TABLET: 500 TABLET ORAL at 07:36

## 2025-06-23 RX ADMIN — FERROUS SULFATE TAB 325 MG (65 MG ELEMENTAL FE) 325 MG: 325 (65 FE) TAB at 07:36

## 2025-06-23 RX ADMIN — CALCIUM GLUCONATE 1 G: 20 INJECTION, SOLUTION INTRAVENOUS at 10:16

## 2025-06-23 RX ADMIN — ENOXAPARIN SODIUM 40 MG: 40 INJECTION SUBCUTANEOUS at 07:36

## 2025-06-23 RX ADMIN — APIXABAN 10 MG: 5 TABLET, FILM COATED ORAL at 12:11

## 2025-06-23 RX ADMIN — PANTOPRAZOLE SODIUM 20 MG: 20 TABLET, DELAYED RELEASE ORAL at 06:07

## 2025-06-23 RX ADMIN — FUROSEMIDE 40 MG: 40 TABLET ORAL at 11:29

## 2025-06-23 NOTE — DISCHARGE SUMMARY
Discharge Summary - Hospitalist   Name: Rose Mary Villaseñor 50 y.o. female I MRN: [id number]  Unit/Bed#: -01 I Date of Admission: 6/21/2025   Date of Service: 6/23/2025 I Hospital Day: 2     Assessment & Plan  Bilateral lower extremity edema  Presents from home with 3+ pitting bilateral lower extremity edema that has been worsening over the past few weeks  Returned last week from a [place] vacation, traveled by air.  States her legs felt much better while they were in the salt water  Significant edema making her legs feel heavy and causing difficulty in ambulation  No evidence of cellulitis on lower extremities  Significant hypoalbuminemia with albumin level of 1.6 that patient states has been a problem since her gastric sleeve and she does follow with nutrition to increase her protein levels  Received IV furosemide in the ED, albumin x 1  Continue diuresis.  Had few doses of IV Lasix and diuresed well will now transition to oral Lasix 20 mg daily.  Due to low normal blood pressures we will check cortisol level and also add midodrine  Check lower extremity venous duplex, rule out DVT  Also schedule outpatient follow-up with vascular surgery to rule out venous insufficiency  Opioid dependence on agonist therapy (HCC)  Continue Suboxone  PDMP reviewed, no red flags  Acute on chronic diastolic (congestive) heart failure (HCC)  Wt Readings from Last 3 Encounters:   06/23/25 91.2 kg (201 lb)   07/31/23 99.8 kg (220 lb)   06/07/21 103 kg (226 lb 10.1 oz)     Review of PCP records patient carries a formal diagnosis of CHF but is not maintained on diuretics, had an echocardiogram in 2018 with a EF 55%  Prior to her gastric bypass she was maintained on Bumex 1 mg twice daily but since she has had a 200 pound weight loss no longer is maintained on any cardiac medications  Presents with lower extremity edema, will continue diuresis but this may be a component of hypoalbuminemia also concerning for venous insufficiency as  patient noted to have a lot of varicose veins and previous history of wounds on the leg cellulitis and chronic lower extremity edema which is now worse  2 g sodium diet  Intake output  Daily weights  Continue IV diuresis.  2D echo ordered.  Transition to oral Lasix on discharge  Patient has known history of gastric bypass surgery if any issues with absorption will need to consider medication changes further however due to low blood pressure is only able to discharge her on Lasix 20 mg oral daily for now and further outpatient follow-up with PCP recommended and also initiation of midodrine  Hypoalbuminemia due to protein-calorie malnutrition (HCC)  History gastric bypass now with absorption all malnutrition.  Patient has worked with nutrition consultation in the past and is trying to incorporate more protein into her diet.  Albumin 1.6  Significant lower extremity edema  Continue to follow-up outpatient with bariatric services at [facility]  Reports compliance with supplements including calcium, vitamin D, copper, iron, vitamin C, vitamin K, MVI, vitamin A, vitamin D, zinc  Body mass index is 37.98 kg/m².        Acute DVT left tibial vein: Patient noted to have acute DVT in the left tibial vein on venous duplex done today will place on Eliquis for at least 3 to 6 months as it appears to be an unprovoked DVT.  Repeat CBC in 1 week and outpatient follow-up with PCP    Medical Problems       Resolved Problems  Date Reviewed: 6/22/2025   None       Discharging Physician / Practitioner: Marisa Carey MD  PCP: Adan Garcia DO  Admission Date:   Admission Orders (From admission, onward)       Ordered        06/21/25 1850  INPATIENT ADMISSION  Once                          Discharge Date: 06/23/25    Next Steps for Physician/AP Assuming Care:  Bmp and bp monitoring    Test Results Pending at Discharge (will require follow up):  none    Medication Changes for Discharge & Rationale:   See after visit summary for reconciled  "discharge medications provided to patient and/or family.     Consultations During Hospital Stay:  none    Procedures Performed:   none    Significant Findings / Test Results:   CT pe study w abdomen pelvis w contrast  Result Date: 6/21/2025  Impression: No acute findings in the chest, abdomen or pelvis. No pulmonary embolus. Hepatomegaly. Computerized Assisted Algorithm (CAA) may have aided analysis of applicable images. Workstation performed: [id number]      Incidental Findings:       Hospital Course:   Rose Mary Villaseñor is a 50 y.o. female patient who originally presented to the hospital on 6/21/2025 due to bilateral lower extremity edema.  Patient was given IV Lasix for diuresis and is now clinically improving.  Will transition to oral Lasix on discharge along with oral midodrine as patient has low normal blood pressures recommend BMP in 1 week and be compliant on her supplements and vitamins as she has known history of prior gastric bypass and absorption issues.    Patient noted to have acute DVT in the left tibial vein will place on Eliquis on discharge    Recommended follow-up outpatient with vascular surgery to evaluate for venous insufficiency      Please see above list of diagnoses and related plan for additional information.     Discharge Day Visit / Exam:   Subjective: Patient denies any chest pain or shortness of breath today and leg swelling is decreasing.  Vitals: Blood Pressure: 99/64 (06/23/25 0856)  Pulse: 66 (06/23/25 0856)  Temperature: (!) 97.3 °F (36.3 °C) (06/23/25 0856)  Temp Source: Temporal (06/22/25 2222)  Respirations: 18 (06/23/25 0723)  Height: 5' 1\" (154.9 cm) (06/23/25 0830)  Weight - Scale: 91.2 kg (201 lb) (06/23/25 0830)  SpO2: 95 % (06/23/25 0856)  Physical Exam  Vitals and nursing note reviewed.   Constitutional:       Appearance: Normal appearance.   HENT:      Head: Normocephalic and atraumatic.      Right Ear: External ear normal.      Left Ear: External ear normal.      Nose: " Nose normal.      Mouth/Throat:      Pharynx: Oropharynx is clear.     Eyes:      Pupils: Pupils are equal, round, and reactive to light.       Cardiovascular:      Rate and Rhythm: Normal rate and regular rhythm.      Heart sounds: Normal heart sounds.   Pulmonary:      Effort: Pulmonary effort is normal.      Breath sounds: Normal breath sounds.   Abdominal:      General: Bowel sounds are normal.      Palpations: Abdomen is soft.      Tenderness: There is no abdominal tenderness.     Musculoskeletal:         General: Normal range of motion.      Cervical back: Normal range of motion and neck supple.      Comments: 1+ nonpitting edema noted of bilateral lower extremity     Skin:     General: Skin is warm and dry.      Capillary Refill: Capillary refill takes less than 2 seconds.     Neurological:      General: No focal deficit present.      Mental Status: She is alert and oriented to person, place, and time.     Psychiatric:         Mood and Affect: Mood normal.            Discussion with Family: Will update family    Discharge instructions/Information to patient and family:   See after visit summary for information provided to patient and family.      Provisions for Follow-Up Care:  See after visit summary for information related to follow-up care and any pertinent home health orders.      Mobility at time of Discharge:   Basic Mobility Inpatient Raw Score: 24  JH-HLM Goal: 8: Walk 250 feet or more  JH-HLM Achieved: 8: Walk 250 feet ot more  HLM Goal achieved. Continue to encourage appropriate mobility.     Disposition:   Home    Planned Readmission: none    Administrative Statements   Discharge Statement:  I have spent a total time of 35 minutes in caring for this patient on the day of the visit/encounter. .    **Please Note: This note may have been constructed using a voice recognition system**

## 2025-06-23 NOTE — UTILIZATION REVIEW
"Initial Clinical Review    Admission: Date/Time/Statement:   Admission Orders (From admission, onward)       Ordered        06/21/25 1850  INPATIENT ADMISSION  Once                          Orders Placed This Encounter   Procedures    INPATIENT ADMISSION     Standing Status:   Standing     Number of Occurrences:   1     Level of Care:   Med Surg [16]     Estimated length of stay:   More than 2 Midnights     Certification:   I certify that inpatient services are medically necessary for this patient for a duration of greater than two midnights. See H&P and MD Progress Notes for additional information about the patient's course of treatment.     ED Arrival Information       Expected   -    Arrival   6/21/2025 15:49    Acuity   Urgent              Means of arrival   Walk-In    Escorted by   Spouse    Service   Hospitalist    Admission type   Emergency              Arrival complaint   leg swelling             Chief Complaint   Patient presents with    Leg Swelling     Patient presents to the ED with complaints of bilateral leg swelling that began a few days ago. The patient also reports \"feeling bloated.\"        6/21/25  Initial Presentation: 50 y.o. female presents to the ED with a PMH of gastric bypass with malabsorption syndrome following with Bariatrics at [facility] and Nutritionist regarding protein malnutrition with acute worsening of bilateral LE edema over the past 2 weeks. She returned from [place] vacation  with worsening swelling and heaving in her legs right > left. Pt appears to have venous stasis dermatitis on  the LE's along with some lymphedema. Prior to pt's 200 lb weight loss she was maintained on Bumex 1mg daily but since weight loss has not been on any cardiac related medications. Reports compliance with supplements including calcium, vitamin D, copper, iron, vitamin C, vitamin K, MVI, vitamin A, vitamin D, zinc  PMH Diastolic CHF, gastric bypass. Exam: +3 bilat LE edema. Abnormal labs/imaging: PT " 15.3, , Anion Gap 1, Albumin 1.6, total Protein 4.1, HGB 10.4, HCT 32.9. IV lasix 40 mg given in the ED and IV Albumin.  Plan : admit to inpatient for bilateral LE edema, IV diuresis, Daily weights, I/O's, LE venous doppler.     Anticipated Length of Stay/Certification Statement:  Patient will be admitted on an inpatient basis with an anticipated length of stay of greater than 2 midnights secondary to lower extremity edema.    Date: 6/22/25   Day 2: Hospitalist: Pt reports since lasix given , legs feel better.  ml. On room air.  lbs 14.3 oz., down from admission weight of 209.88 lbs. MG 1.6, MG sulfate 2 gm x 2 ordered, Significant hypoalbuminemia with albumin level of 1.6 that patient states has been a problem since her gastric sleeve and she does follow with nutrition to increase her protein levels ,this may be a component of hypoalbuminemia also concerning for venous insufficiency as patient noted to have a lot of varicose veins and previous history of wounds on the leg cellulitis and chronic lower extremity edema which is now worse Plan: c/w IV lasix 40 mg 2x daily , daily weights, I/O's, 2 GM sodium diet, Echo pending, c/w Suboxone ,repeat renal function on 6/23, compression stockings.       ED Treatment-Medication Administration from 06/21/2025 1546 to 06/21/2025 1922         Date/Time Order Dose Route Action     06/21/2025 1708 furosemide (LASIX) injection 40 mg 40 mg Intravenous Given     06/21/2025 1659 iohexol (OMNIPAQUE) 350 MG/ML injection (MULTI-DOSE) 100 mL 100 mL Intravenous Given            Scheduled Medications:  apixaban, 10 mg, Oral, BID  atorvastatin, 40 mg, Oral, Daily  buprenorphine-naloxone, 12 mg, Sublingual, Daily  buprenorphine-naloxone, 8 mg, Sublingual, QPM  calcium carbonate, 1 tablet, Oral, TID With Meals  enoxaparin, 40 mg, Subcutaneous, Daily  ferrous sulfate, 325 mg, Oral, Daily  furosemide, 40 mg, Oral, Daily  midodrine, 5 mg, Oral, BID AC  pantoprazole, 20 mg,  Oral, BID AC  furosemide (LASIX) injection 40 mg  Dose: 40 mg  Freq: 2 times daily (diuretic) Route: IV  Start: 06/22/25 0800 End: 06/23/25 1029    Continuous IV Infusions: none      PRN Meds:  acetaminophen, 650 mg, Oral, Q6H PRN      ED Triage Vitals [06/21/25 1555]   Temperature Pulse Respirations Blood Pressure SpO2 Pain Score   (!) 97 °F (36.1 °C) 77 16 127/71 97 % 4     Weight (last 2 days)       Date/Time Weight    06/23/25 0830 91.2 (201)    06/23/25 0549 91.5 (201.72)    06/22/25 0604 94.3 (207.89)    06/22/25 0600 94.3 (207.89)     Weight: Simultaneous filing. User may not have seen previous data. at 06/22/25 0600    06/21/25 1925 95.2 (209.88)    06/21/25 1555 99.9 (220.24)            Vital Signs (last 3 days)       Date/Time Temp Pulse Resp BP MAP (mmHg) SpO2 O2 Device Patient Position - Orthostatic VS [place] Coma Scale Score Pain    06/23/25 11:29:37 97.3 °F (36.3 °C) 62 -- 117/68 84 97 % -- -- -- --    06/23/25 08:56:18 97.3 °F (36.3 °C) 66 -- 99/64 76 95 % -- -- -- --    06/23/25 0830 -- 63 -- 99/65 -- 94 % -- -- -- --    06/23/25 07:36:01 -- 69 -- 99/65 76 94 % -- -- -- --    06/23/25 0736 -- -- -- -- -- -- -- -- 15 No Pain    06/23/25 07:23:06 96.8 °F (36 °C) 68 18 108/72 84 96 % None (Room air) -- -- --    06/22/25 2227 -- 73 -- 94/60 -- -- -- -- -- --    06/22/25 22:22:48 96.8 °F (36 °C) 67 18 89/59 64 96 % None (Room air) Lying -- --    06/22/25 1938 -- -- -- -- -- -- -- -- -- 4    06/22/25 1621 -- 62 -- 104/69 -- -- -- -- -- --    06/22/25 15:26:53 97.2 °F (36.2 °C) 62 18 101/65 77 96 % -- -- -- --    06/22/25 1100 -- -- -- -- -- -- None (Room air) -- 15 No Pain    06/22/25 09:36:28 -- 64 -- 111/67 82 96 % -- -- -- --    06/22/25 06:32:08 97.7 °F (36.5 °C) 71 16 94/57 69 95 % -- -- -- --    06/21/25 21:47:34 97.7 °F (36.5 °C) 67 -- 107/65 79 98 % -- -- -- --    06/21/25 19:30:34 97.3 °F (36.3 °C) 60 18 123/77 92 98 % None (Room air) Lying -- --    06/21/25 1926 -- -- -- -- -- -- -- -- -- 3     06/21/25 1900 -- 68 18 142/82 106 100 % None (Room air) Lying -- --    06/21/25 1840 -- 56 14 117/75 91 98 % None (Room air) Lying -- --    06/21/25 1730 -- 67 17 120/77 94 98 % None (Room air) Lying -- --    06/21/25 1707 -- 63 19 116/66 85 97 % None (Room air) Lying -- --    06/21/25 1645 -- 60 15 105/65 80 96 % None (Room air) Lying -- --    06/21/25 1644 -- -- -- -- -- -- None (Room air) -- 15 --    06/21/25 1620 -- 65 19 118/71 89 94 % None (Room air) Sitting -- --    06/21/25 1600 -- 68 17 118/65 86 97 % None (Room air) Sitting -- --    06/21/25 1555 97 °F (36.1 °C) 77 16 127/71 -- 97 % None (Room air) Sitting -- 4              Pertinent Labs/Diagnostic Test Results:   Radiology:  CT pe study w abdomen pelvis w contrast   Final Interpretation by Thony Reid MD (06/21 1847)      No acute findings in the chest, abdomen or pelvis. No pulmonary embolus.      Hepatomegaly.                  Computerized Assisted Algorithm (CAA) may have aided analysis of applicable images.      Workstation performed: [id number]          VAS VENOUS DUPLEX - LOWER LIMB BILATERAL    (Results Pending)     Cardiology:  Echo complete w/ contrast if indicated   Final Result by Jayden Castro Jr., MD (06/23 0910)        Left Ventricle: Left ventricular cavity size is normal. There is mild    concentric hypertrophy. The left ventricular ejection fraction is 64% by    biplane measurement. Systolic function is normal. Although no diagnostic    regional wall motion abnormality was identified, this possibility cannot    be completely excluded on the basis of this study. Diastolic function is    abnormal.     Right Ventricle: Right ventricular cavity size is mildly dilated.    Systolic function is normal. Normal tricuspid annular plane systolic    excursion (TAPSE) > 1.7 cm.     Right Atrium: The atrium is mildly dilated.     Mitral Valve: There is mild annular calcification.     Tricuspid Valve: The right ventricular systolic  pressure is normal. The    estimated right ventricular systolic pressure is 19.00 mmHg.     IVC/SVC: The right atrial pressure is estimated at 3.0 mmHg. The    inferior vena cava is normal in size. Respirophasic changes were normal.     No prior study available for comparison.         ECG 12 lead   Final Result by Jayden Castro Jr., MD (06/23 0810)   Normal sinus rhythm   Normal ECG   No previous ECGs available   Confirmed by Jayden Castro ([id number]) on 6/23/2025 8:10:05 AM              Results from last 7 days   Lab Units 06/22/25  0458 06/21/25  1631   WBC Thousand/uL 4.45 5.69   HEMOGLOBIN g/dL 9.9* 10.4*   HEMATOCRIT % 30.7* 32.9*   PLATELETS Thousands/uL 183 204   TOTAL NEUT ABS Thousands/µL  --  2.90         Results from last 7 days   Lab Units 06/23/25  0553 06/22/25  0458 06/21/25  1631   SODIUM mmol/L 142 142 141   POTASSIUM mmol/L 4.2 3.9 4.1   CHLORIDE mmol/L 107 111* 113*   CO2 mmol/L 34* 31 27   ANION GAP mmol/L 1* 0* 1*   BUN mg/dL 17 13 15   CREATININE mg/dL 0.83 0.83 1.02   EGFR ml/min/1.73sq m 83 83 64   CALCIUM mg/dL 7.0* 7.0* 7.0*   CALCIUM, IONIZED mmol/L 1.05*  --   --    MAGNESIUM mg/dL 2.0 1.6*  --    PHOSPHORUS mg/dL  --  4.3  --      Results from last 7 days   Lab Units 06/22/25  0458 06/21/25  1631   AST U/L 19 26   ALT U/L 25 30   ALK PHOS U/L 72 81   TOTAL PROTEIN g/dL 3.8* 4.1*   ALBUMIN g/dL 1.6* 1.6*   TOTAL BILIRUBIN mg/dL 0.38 0.35         Results from last 7 days   Lab Units 06/23/25  0553 06/22/25  0458 06/21/25  1631   GLUCOSE RANDOM mg/dL 70 71 76                 Results from last 7 days   Lab Units 06/21/25  1631   HS TNI 0HR ng/L 3         Results from last 7 days   Lab Units 06/21/25  1631   PROTIME seconds 15.3*   INR  1.17   PTT seconds 29     Results from last 7 days   Lab Units 06/22/25  0458   TSH 3RD GENERATON uIU/mL 1.546           Results from last 7 days   Lab Units 06/21/25  1631   BNP pg/mL 224*                 Past Medical History[1]  Present on  Admission:  **None**      Admitting Diagnosis: Leg swelling [M79.89]  Age/Sex: 50 y.o. female    Network Utilization Review Department  ATTENTION: Please call with any questions or concerns to [phone] and carefully listen to the prompts so that you are directed to the right person. All voicemails are confidential.   For Discharge needs, contact Care Management DC Support Team at [phone] opt. 2  Send all requests for admission clinical reviews, approved or denied determinations and any other requests to dedicated fax number below belonging to the [facility] where the patient is receiving treatment. List of dedicated fax numbers for the Facilities:  FACILITY NAME UR FAX NUMBER   ADMISSION DENIALS (Administrative/Medical Necessity) [phone]   DISCHARGE SUPPORT TEAM (NETWORK) [phone]   PARENT CHILD HEALTH (Maternity/NICU/Pediatrics) [phone]   [facility] [phone]   [facility] [phone]   [facility] [phone]   [facility] [phone]   [facility] [phone]   [facility] [phone]   [facility] [phone]   [facility] [phone]   [facility] [phone]   [facility] [phone]   [facility] [phone]   [facility] [phone]              [1]   Past Medical History:  Diagnosis Date    Cellulitis     COPD (chronic obstructive pulmonary disease) (HCC)

## 2025-06-23 NOTE — CASE MANAGEMENT
Case Management Discharge Planning Note    Patient name Rose Mary Villaseñor  Location /-01 MRN [id number]  : 1975 Date 2025       Current Admission Date: 2025  Current Admission Diagnosis:Bilateral lower extremity edema   Patient Active Problem List    Diagnosis Date Noted    Opioid dependence on agonist therapy (HCC) 2025    Acute on chronic diastolic (congestive) heart failure (HCC) 2025    Hypoalbuminemia due to protein-calorie malnutrition (HCC) 2025    Bilateral lower extremity edema 2025      LOS (days): 2  Geometric Mean LOS (GMLOS) (days):   Days to GMLOS:     OBJECTIVE:  Risk of Unplanned Readmission Score: 18.8         Current admission status: Inpatient   Preferred Pharmacy:   [facility] - [address]  [address] [place] [address]  Phone: [phone] Fax: [phone]    Primary Care Provider: Adan Garcia DO    Primary Insurance: DANE DOMÍNGUEZ  Secondary Insurance:     DISCHARGE DETAILS:                 CM called [facility] to price check Eliquis. CM made aware prescription has not been processed and CM can call back in two hours for price.

## 2025-06-23 NOTE — PLAN OF CARE
Problem: PAIN - ADULT  Goal: Verbalizes/displays adequate comfort level or baseline comfort level  Description: Interventions:  - Encourage patient to monitor pain and request assistance  - Assess pain using appropriate pain scale  - Administer analgesics as ordered based on type and severity of pain and evaluate response  - Implement non-pharmacological measures as appropriate and evaluate response  - Consider cultural and social influences on pain and pain management  - Notify physician/advanced practitioner if interventions unsuccessful or patient reports new pain  - Educate patient/family on pain management process including their role and importance of  reporting pain   - Provide non-pharmacologic/complimentary pain relief interventions  Outcome: Progressing     Problem: INFECTION - ADULT  Goal: Absence or prevention of progression during hospitalization  Description: INTERVENTIONS:  - Assess and monitor for signs and symptoms of infection  - Monitor lab/diagnostic results  - Monitor all insertion sites, i.e. indwelling lines, tubes, and drains  - Monitor endotracheal if appropriate and nasal secretions for changes in amount and color  - [place] appropriate cooling/warming therapies per order  - Administer medications as ordered  - Instruct and encourage patient and family to use good hand hygiene technique  - Identify and instruct in appropriate isolation precautions for identified infection/condition  Outcome: Progressing  Goal: Absence of fever/infection during neutropenic period  Description: INTERVENTIONS:  - Monitor WBC  - Perform strict hand hygiene  - Limit to healthy visitors only  - No plants, dried, fresh or silk flowers with bradley in patient room  Outcome: Progressing     Problem: SAFETY ADULT  Goal: Patient will remain free of falls  Description: INTERVENTIONS:  - Educate patient/family on patient safety including physical limitations  - Instruct patient to call for assistance with activity   -  Consider consulting OT/PT to assist with strengthening/mobility based on AM PAC & JH-HLM score  - Consult OT/PT to assist with strengthening/mobility   - Keep Call bell within reach  - Keep bed low and locked with side rails adjusted as appropriate  - Keep care items and personal belongings within reach  - Initiate and maintain comfort rounds  - Make Fall Risk Sign visible to staff  - Offer Toileting every 2 Hours, in advance of need  - Apply yellow socks and bracelet for high fall risk patients  - Consider moving patient to room near nurses station  Outcome: Progressing  Goal: Maintain or return to baseline ADL function  Description: INTERVENTIONS:  -  Assess patient's ability to carry out ADLs; assess patient's baseline for ADL function and identify physical deficits which impact ability to perform ADLs (bathing, care of mouth/teeth, toileting, grooming, dressing, etc.)  - Assess/evaluate cause of self-care deficits   - Assess range of motion  - Assess patient's mobility; develop plan if impaired  - Assess patient's need for assistive devices and provide as appropriate  - Encourage maximum independence but intervene and supervise when necessary  - Involve family in performance of ADLs  - Assess for home care needs following discharge   - Consider OT consult to assist with ADL evaluation and planning for discharge  - Provide patient education as appropriate  - Monitor functional capacity and physical performance, use of AM PAC & JH-HLM   - Monitor gait, balance and fatigue with ambulation    Outcome: Progressing  Goal: Maintains/Returns to pre admission functional level  Description: INTERVENTIONS:  - Perform AM-PAC 6 Click Basic Mobility/ Daily Activity assessment daily.  - Set and communicate daily mobility goal to care team and patient/family/caregiver.   - Collaborate with rehabilitation services on mobility goals if consulted  - Perform Range of Motion 3 times a day.  - Ambulate patient 3 times a day  - Out of  bed to chair 3 times a day   - Out of bed for meals 3 times a day  - Out of bed for toileting  - Record patient progress and toleration of activity level   Outcome: Progressing     Problem: DISCHARGE PLANNING  Goal: Discharge to home or other facility with appropriate resources  Description: INTERVENTIONS:  - Identify barriers to discharge w/patient and caregiver  - Arrange for needed discharge resources and transportation as appropriate  - Identify discharge learning needs (meds, wound care, etc.)  - Arrange for interpretive services to assist at discharge as needed  - Refer to Case Management Department for coordinating discharge planning if the patient needs post-hospital services based on physician/advanced practitioner order or complex needs related to functional status, cognitive ability, or social support system  Outcome: Progressing     Problem: Knowledge Deficit  Goal: Patient/family/caregiver demonstrates understanding of disease process, treatment plan, medications, and discharge instructions  Description: Complete learning assessment and assess knowledge base.  Interventions:  - Provide teaching at level of understanding  - Provide teaching via preferred learning methods  Outcome: Progressing     Problem: METABOLIC, FLUID AND ELECTROLYTES - ADULT  Goal: Electrolytes maintained within normal limits  Description: INTERVENTIONS:  - Monitor labs and assess patient for signs and symptoms of electrolyte imbalances  - Administer electrolyte replacement as ordered magnesium  - Monitor response to electrolyte replacements, including repeat lab results as appropriate  - Instruct patient on fluid and nutrition as appropriate  Outcome: Progressing  Goal: Fluid balance maintained  Description: INTERVENTIONS:  - Monitor labs   - Monitor I/O and WT  - Instruct patient on fluid and nutrition as appropriate  - Assess for signs & symptoms of volume excess or deficit  Outcome: Progressing     Problem: HEMATOLOGIC -  ADULT  Goal: Maintains hematologic stability  Description: INTERVENTIONS  - Assess for signs and symptoms of bleeding or hemorrhage  - Monitor labs  - Administer supportive blood products/factors as ordered and appropriate  Outcome: Progressing

## 2025-06-23 NOTE — ASSESSMENT & PLAN NOTE
Wt Readings from Last 3 Encounters:   06/23/25 91.2 kg (201 lb)   07/31/23 99.8 kg (220 lb)   06/07/21 103 kg (226 lb 10.1 oz)     Review of PCP records patient carries a formal diagnosis of CHF but is not maintained on diuretics, had an echocardiogram in 2018 with a EF 55%  Prior to her gastric bypass she was maintained on Bumex 1 mg twice daily but since she has had a 200 pound weight loss no longer is maintained on any cardiac medications  Presents with lower extremity edema, will continue diuresis but this may be a component of hypoalbuminemia also concerning for venous insufficiency as patient noted to have a lot of varicose veins and previous history of wounds on the leg cellulitis and chronic lower extremity edema which is now worse  2 g sodium diet  Intake output  Daily weights  Continue IV diuresis.  2D echo ordered.  Transition to oral Lasix on discharge  Patient has known history of gastric bypass surgery if any issues with absorption will need to consider medication changes further however due to low blood pressure is only able to discharge her on Lasix 20 mg oral daily for now and further outpatient follow-up with PCP recommended and also initiation of midodrine

## 2025-06-23 NOTE — ASSESSMENT & PLAN NOTE
History gastric bypass now with absorption all malnutrition.  Patient has worked with nutrition consultation in the past and is trying to incorporate more protein into her diet.  Albumin 1.6  Significant lower extremity edema  Continue to follow-up outpatient with bariatric services at [facility]  Reports compliance with supplements including calcium, vitamin D, copper, iron, vitamin C, vitamin K, MVI, vitamin A, vitamin D, zinc  Body mass index is 37.98 kg/m².

## 2025-06-23 NOTE — PLAN OF CARE
Problem: PAIN - ADULT  Goal: Verbalizes/displays adequate comfort level or baseline comfort level  Description: Interventions:  - Encourage patient to monitor pain and request assistance  - Assess pain using appropriate pain scale  - Administer analgesics as ordered based on type and severity of pain and evaluate response  - Implement non-pharmacological measures as appropriate and evaluate response  - Consider cultural and social influences on pain and pain management  - Notify physician/advanced practitioner if interventions unsuccessful or patient reports new pain  - Educate patient/family on pain management process including their role and importance of  reporting pain   - Provide non-pharmacologic/complimentary pain relief interventions  Outcome: Progressing     Problem: INFECTION - ADULT  Goal: Absence or prevention of progression during hospitalization  Description: INTERVENTIONS:  - Assess and monitor for signs and symptoms of infection  - Monitor lab/diagnostic results  - Monitor all insertion sites, i.e. indwelling lines, tubes, and drains  - Monitor endotracheal if appropriate and nasal secretions for changes in amount and color  - Shade appropriate cooling/warming therapies per order  - Administer medications as ordered  - Instruct and encourage patient and family to use good hand hygiene technique  - Identify and instruct in appropriate isolation precautions for identified infection/condition  Outcome: Progressing  Goal: Absence of fever/infection during neutropenic period  Description: INTERVENTIONS:  - Monitor WBC  - Perform strict hand hygiene  - Limit to healthy visitors only  - No plants, dried, fresh or silk flowers with bradley in patient room  Outcome: Progressing     Problem: SAFETY ADULT  Goal: Patient will remain free of falls  Description: INTERVENTIONS:  - Educate patient/family on patient safety including physical limitations  - Instruct patient to call for assistance with activity   -  Consider consulting OT/PT to assist with strengthening/mobility based on AM PAC & JH-HLM score  - Consult OT/PT to assist with strengthening/mobility   - Keep Call bell within reach  - Keep bed low and locked with side rails adjusted as appropriate  - Keep care items and personal belongings within reach  - Initiate and maintain comfort rounds  - Make Fall Risk Sign visible to staff  - Offer Toileting every 2 Hours, in advance of need  - Apply yellow socks and bracelet for high fall risk patients  - Consider moving patient to room near nurses station  Outcome: Progressing  Goal: Maintain or return to baseline ADL function  Description: INTERVENTIONS:  -  Assess patient's ability to carry out ADLs; assess patient's baseline for ADL function and identify physical deficits which impact ability to perform ADLs (bathing, care of mouth/teeth, toileting, grooming, dressing, etc.)  - Assess/evaluate cause of self-care deficits   - Assess range of motion  - Assess patient's mobility; develop plan if impaired  - Assess patient's need for assistive devices and provide as appropriate  - Encourage maximum independence but intervene and supervise when necessary  - Involve family in performance of ADLs  - Assess for home care needs following discharge   - Consider OT consult to assist with ADL evaluation and planning for discharge  - Provide patient education as appropriate  - Monitor functional capacity and physical performance, use of AM PAC & JH-HLM   - Monitor gait, balance and fatigue with ambulation    Outcome: Progressing  Goal: Maintains/Returns to pre admission functional level  Description: INTERVENTIONS:  - Perform AM-PAC 6 Click Basic Mobility/ Daily Activity assessment daily.  - Set and communicate daily mobility goal to care team and patient/family/caregiver.   - Collaborate with rehabilitation services on mobility goals if consulted  - Perform Range of Motion 3 times a day.  - Ambulate patient 3 times a day  - Out of  bed to chair 3 times a day   - Out of bed for meals 3 times a day  - Out of bed for toileting  - Record patient progress and toleration of activity level   Outcome: Progressing     Problem: DISCHARGE PLANNING  Goal: Discharge to home or other facility with appropriate resources  Description: INTERVENTIONS:  - Identify barriers to discharge w/patient and caregiver  - Arrange for needed discharge resources and transportation as appropriate  - Identify discharge learning needs (meds, wound care, etc.)  - Arrange for interpretive services to assist at discharge as needed  - Refer to Case Management Department for coordinating discharge planning if the patient needs post-hospital services based on physician/advanced practitioner order or complex needs related to functional status, cognitive ability, or social support system  Outcome: Progressing     Problem: Knowledge Deficit  Goal: Patient/family/caregiver demonstrates understanding of disease process, treatment plan, medications, and discharge instructions  Description: Complete learning assessment and assess knowledge base.  Interventions:  - Provide teaching at level of understanding  - Provide teaching via preferred learning methods  Outcome: Progressing     Problem: METABOLIC, FLUID AND ELECTROLYTES - ADULT  Goal: Electrolytes maintained within normal limits  Description: INTERVENTIONS:  - Monitor labs and assess patient for signs and symptoms of electrolyte imbalances  - Administer electrolyte replacement as ordered magnesium  - Monitor response to electrolyte replacements, including repeat lab results as appropriate  - Instruct patient on fluid and nutrition as appropriate  Outcome: Progressing  Goal: Fluid balance maintained  Description: INTERVENTIONS:  - Monitor labs   - Monitor I/O and WT  - Instruct patient on fluid and nutrition as appropriate  - Assess for signs & symptoms of volume excess or deficit  Outcome: Progressing     Problem: HEMATOLOGIC -  ADULT  Goal: Maintains hematologic stability  Description: INTERVENTIONS  - Assess for signs and symptoms of bleeding or hemorrhage  - Monitor labs  - Administer supportive blood products/factors as ordered and appropriate  Outcome: Progressing

## 2025-06-23 NOTE — DISCHARGE INSTR - AVS FIRST PAGE
Please take Lasix 20 mg daily at home along with midodrine twice daily.  Please make sure to do the blood work ordered in 1 week (bmp and cbc)  Please take Eliquis 10 mg twice daily for 7 days and then 5 mg twice daily after that and continue it for at least 3 to 6 months.  please make sure you follow-up with your family doctor in 1 week

## 2025-06-24 NOTE — UTILIZATION REVIEW
NOTIFICATION OF ADMISSION DISCHARGE   This is a Notification of Discharge from [facility]. Please be advised that this patient has been discharge from our facility. Below you will find the admission and discharge date and time including the patient’s disposition.   UTILIZATION REVIEW CONTACT:  Utilization Review Assistants  Network Utilization Review Department  Phone: [phone] x carefully listen to the prompts. All voicemails are confidential.  Email: [email or web address]     ADMISSION INFORMATION  PRESENTATION DATE: 6/21/2025  3:52 PM  OBERVATION ADMISSION DATE: N/A  INPATIENT ADMISSION DATE: 6/21/25  6:50 PM   DISCHARGE DATE: 6/23/2025 12:33 PM   DISPOSITION:Home/Self Care    Network Utilization Review Department  ATTENTION: Please call with any questions or concerns to [phone] and carefully listen to the prompts so that you are directed to the right person. All voicemails are confidential.   For Discharge needs, contact Care Management DC Support Team at [phone] opt. 2  Send all requests for admission clinical reviews, approved or denied determinations and any other requests to dedicated fax number below belonging to the campus where the patient is receiving treatment. List of dedicated fax numbers for the Facilities:  FACILITY NAME UR FAX NUMBER   ADMISSION DENIALS (Administrative/Medical Necessity) [phone]   DISCHARGE SUPPORT TEAM ([facility]) [phone]   PARENT CHILD HEALTH (Maternity/NICU/Pediatrics) [phone]   [facility] [phone]   [facility] [phone]   [facility] [phone]   [facility] [phone]   [facility] [phone]   [facility] [phone]   [facility] [phone]   [facility] [phone]   [facility]  [facility] [phone]   [facility] [phone]   General acute hospital [phone]   [facility] [phone]

## 2025-07-09 PROBLEM — I50.32 CHRONIC HEART FAILURE WITH PRESERVED EJECTION FRACTION (HFPEF) (HCC): Status: ACTIVE | Noted: 2025-07-09

## 2025-07-09 NOTE — PROGRESS NOTES
Outpatient Cardiology Consult Note - Rose Mary Villaseñor 50 y.o. female MRN: [id number]    @ Encounter: [id number]        Patient Active Problem List    Diagnosis Date Noted    Chronic heart failure with preserved ejection fraction (HFpEF) (HCC) 07/09/2025    Opioid dependence on agonist therapy (HCC) 06/22/2025    Acute on chronic diastolic (congestive) heart failure (HCC) 06/22/2025    Hypoalbuminemia due to protein-calorie malnutrition (HCC) 06/22/2025    Bilateral lower extremity edema 06/21/2025       Assessment:  # Chronic HFpEF- not really  More so due to low oncotic pressure from malabsorption  Diuretic: lasix 20 mg daily- bumex 1 mg daily  SGLT2i:  Weight: 220 lbs (wt 201 lbs) on discharge  BNP:    Studies- personally reviewed by me  Echo 6/23/25:  LVEF: 65%  RV: mildly dilated  PASP: 22 mmHg    # opiod dependence  - on suboxone  # hx of gastric bypass  Absorption malnutrition  # venous stasis dermatitis  # DVT  Eliquis    Today's Plan:  Change diuretic to bumex 1 mg daily, can BID it if needed  Already eats a lot of protein  On eliquis for DVT    HPI:      49 yo female following up after admit for acute on chronic HFpEF. She had presented with 3+ bilateral pitting edema after vacation in [place]. Also with hx of gastric byapss with malabsorption syndrome following. 200 lb weight loss.     Past Medical History[1]    Review of Systems   Constitutional:  Negative for activity change, appetite change, fatigue and unexpected weight change.   HENT:  Negative for congestion and nosebleeds.    Eyes: Negative.    Respiratory:  Negative for cough, chest tightness and shortness of breath.    Cardiovascular:  Negative for chest pain, palpitations and leg swelling.   Gastrointestinal:  Negative for abdominal distention.   Endocrine: Negative.    Genitourinary: Negative.    Musculoskeletal: Negative.    Skin: Negative.    Neurological:  Negative for dizziness, syncope and weakness.   Hematological: Negative.     Psychiatric/Behavioral: Negative.         Allergies[2]  .  Current Medications[3]    Social History     Socioeconomic History    Marital status: Unknown     Spouse name: Not on file    Number of children: Not on file    Years of education: Not on file    Highest education level: Not on file   Occupational History    Not on file   Tobacco Use    Smoking status: Former    Smokeless tobacco: Never   Vaping Use    Vaping status: Never Used   Substance and Sexual Activity    Alcohol use: No    Drug use: No    Sexual activity: Not on file   Other Topics Concern    Not on file   Social History Narrative    Not on file     Social Drivers of Health     Financial Resource Strain: Low Risk  (1/28/2025)    Received from [employer]    Financial Resource Strain     Do you have any trouble paying for your medications, or do you think you might in the future?: No     Does your family have trouble paying for medicine? (Household - for ages 0-17 years): Not on file   Food Insecurity: Unknown (1/28/2025)    Received from [employer]    Hunger Vital Sign     Within the past 12 months, you worried that your food would run out before you got the money to buy more.: Never true     Within the past 12 months, the food you bought just didn't last and you didn't have money to get more.: Patient declined   Transportation Needs: No Transportation Needs (1/28/2025)    Received from [employer]    Transportation Needs     Do you have trouble getting a ride to medical visits or work? (Adult - for ages 18 years and over): Not on file     Does your family have a hard time getting a ride to doctors’ visits? (Household - for ages 0-17 years): Not on file     Has lack of transportation kept you from medical appointments, meetings, work, or from getting things needed for daily living? Check all that apply.: No     Do you (or your family) have trouble finding or paying for a ride (transportation)? (Household - for ages 0-17 years): Not on file   Physical  "Activity: Not on file   Stress: Not on file   Social Connections: Socially Integrated (1/28/2025)    Received from [employer]     How often do you feel lonely or isolated from those around you?: Never   Intimate Partner Violence: Not on file   Housing Stability: Low Risk  (1/28/2025)    Received from [employer]    Housing Stability     Do you currently live in a shelter or have no steady place to sleep at night?: No     Do you think you are at risk of becoming homeless? (Adult - for ages 18 years and over): Not on file     Does your family worry about paying for your home or becoming homeless? (Household - for ages 0-17 years): Not on file     Are you homeless or worried that you might be in the future?: No     Are you (or your family) homeless or worried that you might be in the future? (Household - for ages 0-17 years): Not on file       Family History[4]    Physical Exam:    Vitals: Blood pressure 90/60, pulse 69, height 5' 1\" (1.549 m), weight 99.8 kg (220 lb), SpO2 100%., Body mass index is 41.57 kg/m².,   Wt Readings from Last 3 Encounters:   07/11/25 99.8 kg (220 lb)   06/23/25 91.2 kg (201 lb)   07/31/23 99.8 kg (220 lb)       Physical Exam  Constitutional:       Appearance: She is well-developed.   HENT:      Head: Normocephalic and atraumatic.     Eyes:      Pupils: Pupils are equal, round, and reactive to light.     Neck:      Vascular: No JVD.     Cardiovascular:      Rate and Rhythm: Normal rate and regular rhythm.      Heart sounds: No murmur heard.  Pulmonary:      Effort: Pulmonary effort is normal. No respiratory distress.      Breath sounds: Normal breath sounds.   Abdominal:      General: There is no distension.      Palpations: Abdomen is soft.      Tenderness: There is no abdominal tenderness.     Musculoskeletal:         General: Normal range of motion.      Cervical back: Normal range of motion.     Skin:     General: Skin is warm and dry.      Findings: No rash. " "    Neurological:      Mental Status: She is alert and oriented to person, place, and time.         Labs & Results:    Lab Results   Component Value Date    WBC 4.45 06/22/2025    HGB 9.9 (L) 06/22/2025    HCT 30.7 (L) 06/22/2025    MCV 96 06/22/2025     06/22/2025     Lab Results   Component Value Date    SODIUM 142 06/30/2025    SODIUM 142 06/30/2025    K 5.1 06/30/2025    K 5.1 06/30/2025     (H) 06/30/2025     (H) 06/30/2025    CO2 27 06/30/2025    CO2 27 06/30/2025    BUN 13 06/30/2025    BUN 13 06/30/2025    CREATININE 1.1 (H) 06/30/2025    CREATININE 1.1 (H) 06/30/2025    GLUC 71 06/30/2025    GLUC 71 06/30/2025    CALCIUM 7.6 (L) 06/30/2025    CALCIUM 7.6 (L) 06/30/2025     Lab Results   Component Value Date     (H) 06/21/2025      No results found for: \"CHOLESTEROL\"  No results found for: \"HDL\"  No results found for: \"TRIG\"  No results found for: \"NONHDLC\"          EKG personally reviewed by Isiah Maciel DO.     Counseling / Coordination of Care  Time spent today 40 minutes.  Greater than 50% of total time was spent with the patient and / or family counseling and / or coordination of care. We discussed diagnoses, most recent studies and any changes in treatment  Thank you for the opportunity to participate in the care of this patient.    ISIAH MACIEL D.O.  DIRECTOR OF HEART FAILURE/ PULMONARY HYPERTENSION  MEDICAL DIRECTOR OF LVAD PROGRAM  [facility]         [1]   Past Medical History:  Diagnosis Date    Cellulitis     COPD (chronic obstructive pulmonary disease) (HCC)    [2] No Known Allergies  [3]   Current Outpatient Medications:     apixaban (Eliquis) 5 mg, Take 2 tablets (10 mg total) by mouth 2 (two) times a day for 7 days, THEN 1 tablet (5 mg total) 2 (two) times a day for 23 days., Disp: 74 tablet, Rfl: 0    bumetanide (BUMEX) 1 mg tablet, Take 1 tablet (1 mg total) by mouth daily, Disp: 90 tablet, Rfl: 2    buprenorphine-naloxone (Suboxone) 8-2 mg, " Place 8 mg under the tongue in the morning and 8 mg in the evening. 1.5 films in AM 1 film at night., Disp: , Rfl:     calcium citrate-vitamin D 315 mg-5 mcg tablet, Take 3 tablets by mouth in the morning and 3 tablets in the evening., Disp: , Rfl:     Cholecalciferol 50 MCG (2000 UT) TABS, Take 2 tablets by mouth 1 (one) time, Disp: , Rfl:     Copper Gluconate (COPPER CAPS PO), Take 2 mg by mouth 1 (one) time TAKEN DAILY AT NOON, Disp: , Rfl:     Iron-Vitamin C  MG TABS, Take 1 tablet by mouth in the morning., Disp: , Rfl:     Menaquinone-7 (Vitamin K2) 100 MCG CAPS, Take 2 capsules by mouth in the morning and 2 capsules before bedtime., Disp: , Rfl:     midodrine (PROAMATINE) 5 mg tablet, Take 1 tablet (5 mg total) by mouth 2 (two) times a day before meals, Disp: 60 tablet, Rfl: 0    Multiple Vitamin (MULTI-VITAMIN) tablet, Take by mouth, Disp: , Rfl:     Multiple Vitamins-Minerals (WOMENS MULTIVITAMIN PO), Take 1 tablet by mouth in the morning and 1 tablet before bedtime., Disp: , Rfl:     OMEPRAZOLE PO, Take 20 mg by mouth in the morning and 20 mg before bedtime., Disp: , Rfl:     vitamin A 3 MG (50997 UT) capsule, Take 10,000 Units by mouth in the morning and 10,000 Units before bedtime., Disp: , Rfl:     vitamin E, tocopherol, 400 units capsule, Take 400 Units by mouth 2 (two) times a day 2 caps in the AM, 1 in Evening, Disp: , Rfl:     zinc sulfate (ZINCATE) 220 mg capsule, Take 220 mg by mouth in the morning and 220 mg in the evening., Disp: , Rfl:     acetaminophen (TYLENOL) 500 mg tablet, Take 500 mg by mouth every 6 (six) hours as needed for mild pain (Patient not taking: Reported on 7/11/2025), Disp: , Rfl:     ergocalciferol (VITAMIN D2) 50,000 units, Take by mouth (Patient not taking: Reported on 7/11/2025), Disp: , Rfl:   [4] No family history on file.

## 2025-07-11 ENCOUNTER — OFFICE VISIT (OUTPATIENT)
Dept: CARDIOLOGY CLINIC | Facility: CLINIC | Age: 50
End: 2025-07-11
Payer: COMMERCIAL

## 2025-07-11 VITALS
HEART RATE: 69 BPM | WEIGHT: 220 LBS | BODY MASS INDEX: 41.54 KG/M2 | SYSTOLIC BLOOD PRESSURE: 90 MMHG | DIASTOLIC BLOOD PRESSURE: 60 MMHG | HEIGHT: 61 IN | OXYGEN SATURATION: 100 %

## 2025-07-11 DIAGNOSIS — I50.32 CHRONIC HEART FAILURE WITH PRESERVED EJECTION FRACTION (HFPEF) (HCC): ICD-10-CM

## 2025-07-11 DIAGNOSIS — R60.0 BILATERAL LOWER EXTREMITY EDEMA: Primary | ICD-10-CM

## 2025-07-11 PROCEDURE — 99204 OFFICE O/P NEW MOD 45 MIN: CPT | Performed by: INTERNAL MEDICINE

## 2025-07-11 RX ORDER — ERGOCALCIFEROL 1.25 MG/1
CAPSULE, LIQUID FILLED ORAL
COMMUNITY

## 2025-07-11 RX ORDER — BUMETANIDE 1 MG/1
1 TABLET ORAL DAILY
Qty: 90 TABLET | Refills: 2 | Status: SHIPPED | OUTPATIENT
Start: 2025-07-11

## 2025-07-17 ENCOUNTER — HOSPITAL ENCOUNTER (EMERGENCY)
Facility: HOSPITAL | Age: 50
Discharge: HOME | End: 2025-07-17
Attending: STUDENT IN AN ORGANIZED HEALTH CARE EDUCATION/TRAINING PROGRAM | Admitting: STUDENT IN AN ORGANIZED HEALTH CARE EDUCATION/TRAINING PROGRAM
Payer: COMMERCIAL

## 2025-07-17 VITALS
OXYGEN SATURATION: 100 % | RESPIRATION RATE: 20 BRPM | HEART RATE: 67 BPM | TEMPERATURE: 97.7 F | SYSTOLIC BLOOD PRESSURE: 110 MMHG | DIASTOLIC BLOOD PRESSURE: 80 MMHG

## 2025-07-17 DIAGNOSIS — E63.9 EDEMA DUE TO NUTRITIONAL DEFICIENCY: Primary | ICD-10-CM

## 2025-07-17 DIAGNOSIS — E88.09 HYPOALBUMINEMIA: ICD-10-CM

## 2025-07-17 DIAGNOSIS — R60.9 EDEMA DUE TO NUTRITIONAL DEFICIENCY: Primary | ICD-10-CM

## 2025-07-17 LAB
ALBUMIN SERPL BCG-MCNC: 1.7 G/DL (ref 3.5–5)
ALP SERPL-CCNC: 110 U/L (ref 34–104)
ALT SERPL W P-5'-P-CCNC: 49 U/L (ref 7–52)
ANION GAP SERPL CALCULATED.3IONS-SCNC: 2 MMOL/L (ref 4–13)
AST SERPL W P-5'-P-CCNC: 39 U/L (ref 13–39)
BASOPHILS # BLD AUTO: 0.02 THOUSANDS/ÂΜL (ref 0–0.1)
BASOPHILS NFR BLD AUTO: 0 % (ref 0–1)
BILIRUB SERPL-MCNC: 0.41 MG/DL (ref 0.2–1)
BUN SERPL-MCNC: 15 MG/DL (ref 5–25)
CALCIUM ALBUM COR SERPL-MCNC: 8.7 MG/DL (ref 8.3–10.1)
CALCIUM SERPL-MCNC: 6.9 MG/DL (ref 8.4–10.2)
CHLORIDE SERPL-SCNC: 112 MMOL/L (ref 96–108)
CO2 SERPL-SCNC: 28 MMOL/L (ref 21–32)
CREAT SERPL-MCNC: 1.05 MG/DL (ref 0.6–1.3)
EOSINOPHIL # BLD AUTO: 0.06 THOUSAND/ÂΜL (ref 0–0.61)
EOSINOPHIL NFR BLD AUTO: 1 % (ref 0–6)
ERYTHROCYTE [DISTWIDTH] IN BLOOD BY AUTOMATED COUNT: 14.5 % (ref 11.6–15.1)
GFR SERPL CREATININE-BSD FRML MDRD: 62 ML/MIN/1.73SQ M
GLUCOSE SERPL-MCNC: 70 MG/DL (ref 65–140)
HCT VFR BLD AUTO: 40.7 % (ref 34.8–46.1)
HGB BLD-MCNC: 12.7 G/DL (ref 11.5–15.4)
IMM GRANULOCYTES # BLD AUTO: 0.02 THOUSAND/UL (ref 0–0.2)
IMM GRANULOCYTES NFR BLD AUTO: 0 % (ref 0–2)
LYMPHOCYTES # BLD AUTO: 3.63 THOUSANDS/ÂΜL (ref 0.6–4.47)
LYMPHOCYTES NFR BLD AUTO: 42 % (ref 14–44)
MAGNESIUM SERPL-MCNC: 1.9 MG/DL (ref 1.9–2.7)
MCH RBC QN AUTO: 30.4 PG (ref 26.8–34.3)
MCHC RBC AUTO-ENTMCNC: 31.2 G/DL (ref 31.4–37.4)
MCV RBC AUTO: 97 FL (ref 82–98)
MONOCYTES # BLD AUTO: 0.7 THOUSAND/ÂΜL (ref 0.17–1.22)
MONOCYTES NFR BLD AUTO: 8 % (ref 4–12)
NEUTROPHILS # BLD AUTO: 4.18 THOUSANDS/ÂΜL (ref 1.85–7.62)
NEUTS SEG NFR BLD AUTO: 49 % (ref 43–75)
NRBC BLD AUTO-RTO: 0 /100 WBCS
PLATELET # BLD AUTO: 225 THOUSANDS/UL (ref 149–390)
PMV BLD AUTO: 9.2 FL (ref 8.9–12.7)
POTASSIUM SERPL-SCNC: 4.6 MMOL/L (ref 3.5–5.3)
PROT SERPL-MCNC: 4.3 G/DL (ref 6.4–8.4)
RBC # BLD AUTO: 4.18 MILLION/UL (ref 3.81–5.12)
SODIUM SERPL-SCNC: 142 MMOL/L (ref 135–147)
WBC # BLD AUTO: 8.61 THOUSAND/UL (ref 4.31–10.16)

## 2025-07-17 PROCEDURE — 83735 ASSAY OF MAGNESIUM: CPT | Performed by: STUDENT IN AN ORGANIZED HEALTH CARE EDUCATION/TRAINING PROGRAM

## 2025-07-17 PROCEDURE — 85025 COMPLETE CBC W/AUTO DIFF WBC: CPT | Performed by: STUDENT IN AN ORGANIZED HEALTH CARE EDUCATION/TRAINING PROGRAM

## 2025-07-17 PROCEDURE — 99284 EMERGENCY DEPT VISIT MOD MDM: CPT

## 2025-07-17 PROCEDURE — 99284 EMERGENCY DEPT VISIT MOD MDM: CPT | Performed by: STUDENT IN AN ORGANIZED HEALTH CARE EDUCATION/TRAINING PROGRAM

## 2025-07-17 PROCEDURE — 80053 COMPREHEN METABOLIC PANEL: CPT | Performed by: STUDENT IN AN ORGANIZED HEALTH CARE EDUCATION/TRAINING PROGRAM

## 2025-07-17 PROCEDURE — 36415 COLL VENOUS BLD VENIPUNCTURE: CPT | Performed by: STUDENT IN AN ORGANIZED HEALTH CARE EDUCATION/TRAINING PROGRAM

## 2025-07-17 NOTE — ED PROVIDER NOTES
Time reflects when diagnosis was documented in both MDM as applicable and the Disposition within this note       Time User Action Codes Description Comment    7/17/2025  9:15 PM Tor Aranda Add [E63.9,  R60.9] Edema due to nutritional deficiency     7/17/2025  9:15 PM Tor Aranda Add [E88.09] Hypoalbuminemia           ED Disposition       ED Disposition   Discharge    Condition   Stable    Date/Time   Thu Jul 17, 2025  9:14 PM    Comment   Rose Mary Villaseñor discharge to home/self care.                   Assessment & Plan       Medical Decision Making  Vital signs reviewed.  No signs of respiratory distress.  Not requiring supplemental oxygen.  The patient is hypervolemic.  Known history of diastolic heart failure, hypoalbuminemia.  Follows with cardiology/nephrology.  Laboratory workup with persistent hypoalbuminemia.  Per chart review, UA without signs of proteinuria.  Low suspicion for nephrotic syndrome.  The case discussed with cardiology/nephrology.  Recommending increasing Bumex from 1 mg daily to twice daily dosing. It was also recommended the patient to have a low-salt diet, high-protein diet. Recommendations/return precautions were discussed with the patient and her . Stable for discharge.     Problems Addressed:  Edema due to nutritional deficiency: chronic illness or injury with exacerbation, progression, or side effects of treatment  Hypoalbuminemia: chronic illness or injury with exacerbation, progression, or side effects of treatment    Amount and/or Complexity of Data Reviewed  External Data Reviewed: notes.     Details: Recent outpatient nephrology/cardiology documentation reviewed.  Labs: ordered.  Discussion of management or test interpretation with external provider(s): The case was discussed with cardiology/nephrology.  See above.      Medications - No data to display    ED Risk Strat Scores      No data recorded    SBIRT 22yo+      Flowsheet Row Most Recent Value   Initial Alcohol  Screen: US AUDIT-C     1. How often do you have a drink containing alcohol? 0 Filed at: 07/17/2025 1822   2. How many drinks containing alcohol do you have on a typical day you are drinking?  0 Filed at: 07/17/2025 1822   3b. FEMALE Any Age, or MALE 65+: How often do you have 4 or more drinks on one occassion? 0 Filed at: 07/17/2025 1822   Audit-C Score 0 Filed at: 07/17/2025 1822   MATTHEW: How many times in the past year have you...    Used an illegal drug or used a prescription medication for non-medical reasons? Never Filed at: 07/17/2025 1822          History of Present Illness       Chief Complaint   Patient presents with    Edema     Pt states she was recently admitted for fluid overload. States she feels like she is filled up with fluid. Tearful in triage, stating it is painful to get around.      Past Medical History[1]   Past Surgical History[2]   Family History[3]   Social History[4]   E-Cigarette/Vaping    E-Cigarette Use Never User       E-Cigarette/Vaping Substances      I have reviewed and agree with the history as documented.     50 F.  History of diastolic heart failure, hypoalbuminemia, status post gastric bypass.  Presents with weight gain, edema. Takes Bumex 1 mg. The patient was evaluated by Cardiology on 7/11. Per documentation, the patient can take Bumex 1 mg BID as needed. Weight at that time was 220 lb. The patient denies shortness of breath.       History provided by:  Patient and medical records    Review of Systems   Constitutional:  Positive for unexpected weight change. Negative for chills, fatigue and fever.   Respiratory:  Negative for cough, chest tightness and shortness of breath.    Cardiovascular:  Positive for leg swelling. Negative for chest pain.   Gastrointestinal:  Positive for abdominal distention. Negative for abdominal pain, nausea and vomiting.   Genitourinary:  Positive for decreased urine volume. Negative for flank pain.   Musculoskeletal:  Positive for gait problem.    Skin:  Negative for color change and wound.   Neurological:  Negative for dizziness, light-headedness and headaches.     Objective       ED Triage Vitals [07/17/25 1821]   Temperature Pulse Blood Pressure Respirations SpO2 Patient Position - Orthostatic VS   97.7 °F (36.5 °C) 70 147/89 18 100 % Sitting      Temp Source Heart Rate Source BP Location FiO2 (%) Pain Score    Temporal Monitor Left arm -- --      Vitals      Date and Time Temp Pulse SpO2 Resp BP Pain Score FACES Pain Rating User   07/17/25 2115 -- 67 100 % 20 110/80 -- -- SB   07/17/25 1856 -- 63 100 % 20 124/83 -- -- MR   07/17/25 1821 97.7 °F (36.5 °C) 70 100 % 18 147/89 -- -- MB            Physical Exam  Vitals and nursing note reviewed.   Constitutional:       General: She is not in acute distress.     Appearance: She is ill-appearing. She is not toxic-appearing.   HENT:      Head: Normocephalic and atraumatic.      Right Ear: External ear normal.      Left Ear: External ear normal.      Nose: No congestion or rhinorrhea.     Eyes:      General: No scleral icterus.        Right eye: No discharge.         Left eye: No discharge.      Extraocular Movements: Extraocular movements intact.      Conjunctiva/sclera: Conjunctivae normal.       Cardiovascular:      Rate and Rhythm: Normal rate and regular rhythm.      Pulses: Normal pulses.      Heart sounds: Normal heart sounds. No murmur heard.  Pulmonary:      Effort: Pulmonary effort is normal. No respiratory distress.      Breath sounds: Normal breath sounds. No stridor. No wheezing, rhonchi or rales.   Chest:      Chest wall: No tenderness.   Abdominal:      General: Bowel sounds are normal. There is distension.      Palpations: Abdomen is soft.      Tenderness: There is no abdominal tenderness. There is no guarding or rebound.     Musculoskeletal:      Right lower leg: Edema present.      Left lower leg: Edema present.     Skin:     General: Skin is warm and dry.      Coloration: Skin is not jaundiced  or pale.     Neurological:      General: No focal deficit present.      Mental Status: She is alert and oriented to person, place, and time.     Psychiatric:         Mood and Affect: Mood normal.         Behavior: Behavior normal.       Results Reviewed       Procedure Component Value Units Date/Time    Comprehensive metabolic panel [[id number]]  (Abnormal) Collected: 07/17/25 2008    Lab Status: Final result Specimen: Blood from Arm, Right Updated: 07/17/25 2034     Sodium 142 mmol/L      Potassium 4.6 mmol/L      Chloride 112 mmol/L      CO2 28 mmol/L      ANION GAP 2 mmol/L      BUN 15 mg/dL      Creatinine 1.05 mg/dL      Glucose 70 mg/dL      Calcium 6.9 mg/dL      Corrected Calcium 8.7 mg/dL      AST 39 U/L      ALT 49 U/L      Alkaline Phosphatase 110 U/L      Total Protein 4.3 g/dL      Albumin 1.7 g/dL      Total Bilirubin 0.41 mg/dL      eGFR 62 ml/min/1.73sq m     Narrative:      [employer] guidelines for Chronic Kidney Disease (CKD):     Stage 1 with normal or high GFR (GFR > 90 mL/min/1.73 square meters)    Stage 2 Mild CKD (GFR = 60-89 mL/min/1.73 square meters)    Stage 3A Moderate CKD (GFR = 45-59 mL/min/1.73 square meters)    Stage 3B Moderate CKD (GFR = 30-44 mL/min/1.73 square meters)    Stage 4 Severe CKD (GFR = 15-29 mL/min/1.73 square meters)    Stage 5 End Stage CKD (GFR <15 mL/min/1.73 square meters)  Note: GFR calculation is accurate only with a steady state creatinine    Magnesium [[id number]]  (Normal) Collected: 07/17/25 2008    Lab Status: Final result Specimen: Blood from Arm, Right Updated: 07/17/25 2034     Magnesium 1.9 mg/dL     CBC and differential [[id number]]  (Abnormal) Collected: 07/17/25 2008    Lab Status: Final result Specimen: Blood from Arm, Right Updated: 07/17/25 2022     WBC 8.61 Thousand/uL      RBC 4.18 Million/uL      Hemoglobin 12.7 g/dL      Hematocrit 40.7 %      MCV 97 fL      MCH 30.4 pg      MCHC 31.2 g/dL      RDW 14.5 %      MPV 9.2 fL       Platelets 225 Thousands/uL      nRBC 0 /100 WBCs      Segmented % 49 %      Immature Grans % 0 %      Lymphocytes % 42 %      Monocytes % 8 %      Eosinophils Relative 1 %      Basophils Relative 0 %      Absolute Neutrophils 4.18 Thousands/µL      Absolute Immature Grans 0.02 Thousand/uL      Absolute Lymphocytes 3.63 Thousands/µL      Absolute Monocytes 0.70 Thousand/µL      Eosinophils Absolute 0.06 Thousand/µL      Basophils Absolute 0.02 Thousands/µL           No orders to display     Procedures    ED Medication and Procedure Management   Prior to Admission Medications   Prescriptions Last Dose Informant Patient Reported? Taking?   Cholecalciferol 50 MCG (2000 UT) TABS  Self Yes No   Sig: Take 2 tablets by mouth 1 (one) time   Copper Gluconate (COPPER CAPS PO)  Self Yes No   Sig: Take 2 mg by mouth 1 (one) time TAKEN DAILY AT NOON   Iron-Vitamin C  MG TABS  Self Yes No   Sig: Take 1 tablet by mouth in the morning.   Menaquinone-7 (Vitamin K2) 100 MCG CAPS  Self Yes No   Sig: Take 2 capsules by mouth in the morning and 2 capsules before bedtime.   Multiple Vitamin (MULTI-VITAMIN) tablet  Self Yes No   Sig: Take by mouth   Multiple Vitamins-Minerals (WOMENS MULTIVITAMIN PO)  Self Yes No   Sig: Take 1 tablet by mouth in the morning and 1 tablet before bedtime.   OMEPRAZOLE PO  Self Yes No   Sig: Take 20 mg by mouth in the morning and 20 mg before bedtime.   acetaminophen (TYLENOL) 500 mg tablet  Self Yes No   Sig: Take 500 mg by mouth every 6 (six) hours as needed for mild pain   Patient not taking: Reported on 7/11/2025   apixaban (Eliquis) 5 mg  Self No No   Sig: Take 2 tablets (10 mg total) by mouth 2 (two) times a day for 7 days, THEN 1 tablet (5 mg total) 2 (two) times a day for 23 days.   bumetanide (BUMEX) 1 mg tablet   No No   Sig: Take 1 tablet (1 mg total) by mouth daily   buprenorphine-naloxone (Suboxone) 8-2 mg  Self Yes No   Sig: Place 8 mg under the tongue in the morning and 8 mg in the  evening. 1.5 films in AM  1 film at night.   calcium citrate-vitamin D 315 mg-5 mcg tablet  Self Yes No   Sig: Take 3 tablets by mouth in the morning and 3 tablets in the evening.   ergocalciferol (VITAMIN D2) 50,000 units  Self Yes No   Sig: Take by mouth   Patient not taking: Reported on 7/11/2025   midodrine (PROAMATINE) 5 mg tablet  Self No No   Sig: Take 1 tablet (5 mg total) by mouth 2 (two) times a day before meals   vitamin A 3 MG (21550 UT) capsule  Self Yes No   Sig: Take 10,000 Units by mouth in the morning and 10,000 Units before bedtime.   vitamin E, tocopherol, 400 units capsule  Self Yes No   Sig: Take 400 Units by mouth 2 (two) times a day 2 caps in the AM, 1 in Evening   zinc sulfate (ZINCATE) 220 mg capsule  Self Yes No   Sig: Take 220 mg by mouth in the morning and 220 mg in the evening.      Facility-Administered Medications: None     Discharge Medication List as of 7/17/2025  9:16 PM        CONTINUE these medications which have NOT CHANGED    Details   acetaminophen (TYLENOL) 500 mg tablet Take 500 mg by mouth every 6 (six) hours as needed for mild pain, Historical Med      apixaban (Eliquis) 5 mg Multiple Dosages:Starting Mon 6/23/2025, Until Sun 6/29/2025 at 2359, THEN Starting Mon 6/30/2025, Until Tue 7/22/2025 at 2359Take 2 tablets (10 mg total) by mouth 2 (two) times a day for 7 days, THEN 1 tablet (5 mg total) 2 (two) times a day for 23 d ays., Normal      bumetanide (BUMEX) 1 mg tablet Take 1 tablet (1 mg total) by mouth daily, Starting Fri 7/11/2025, Normal      buprenorphine-naloxone (Suboxone) 8-2 mg Place 8 mg under the tongue in the morning and 8 mg in the evening. 1.5 films in AM  1 film at night., Historical Med      calcium citrate-vitamin D 315 mg-5 mcg tablet Take 3 tablets by mouth in the morning and 3 tablets in the evening., Historical Med      Cholecalciferol 50 MCG (2000 UT) TABS Take 2 tablets by mouth 1 (one) time, Historical Med      Copper Gluconate (COPPER CAPS PO)  Take 2 mg by mouth 1 (one) time TAKEN DAILY AT NOON, Historical Med      ergocalciferol (VITAMIN D2) 50,000 units Take by mouth, Historical Med      Iron-Vitamin C  MG TABS Take 1 tablet by mouth in the morning., Historical Med      Menaquinone-7 (Vitamin K2) 100 MCG CAPS Take 2 capsules by mouth in the morning and 2 capsules before bedtime., Historical Med      midodrine (PROAMATINE) 5 mg tablet Take 1 tablet (5 mg total) by mouth 2 (two) times a day before meals, Starting Mon 6/23/2025, Until Wed 7/23/2025, Normal      Multiple Vitamin (MULTI-VITAMIN) tablet Take by mouth, Historical Med      Multiple Vitamins-Minerals (WOMENS MULTIVITAMIN PO) Take 1 tablet by mouth in the morning and 1 tablet before bedtime., Historical Med      OMEPRAZOLE PO Take 20 mg by mouth in the morning and 20 mg before bedtime., Historical Med      vitamin A 3 MG (34407 UT) capsule Take 10,000 Units by mouth in the morning and 10,000 Units before bedtime., Historical Med      vitamin E, tocopherol, 400 units capsule Take 400 Units by mouth 2 (two) times a day 2 caps in the AM, 1 in Evening, Historical Med      zinc sulfate (ZINCATE) 220 mg capsule Take 220 mg by mouth in the morning and 220 mg in the evening., Historical Med           No discharge procedures on file.  ED SEPSIS DOCUMENTATION   Time reflects when diagnosis was documented in both MDM as applicable and the Disposition within this note       Time User Action Codes Description Comment    7/17/2025  9:15 PM Tor Aranda [E63.9,  R60.9] Edema due to nutritional deficiency     7/17/2025  9:15 PM Tor Aranda [E88.09] Hypoalbuminemia                      [1]   Past Medical History:  Diagnosis Date    Cellulitis     COPD (chronic obstructive pulmonary disease) (HCC)    [2]   Past Surgical History:  Procedure Laterality Date    GASTRECTOMY SLEEVE LAPAROSCOPIC      HERNIA REPAIR     [3] No family history on file.  [4]   Social History  Tobacco Use    Smoking  status: Former    Smokeless tobacco: Never   Vaping Use    Vaping status: Never Used   Substance Use Topics    Alcohol use: No    Drug use: No        Tor Aranda,   07/18/25 1017

## 2025-07-18 NOTE — DISCHARGE INSTRUCTIONS
Your albumin today is 1.7.    The case was discussed with cardiology and nephrology.  Recommending increasing your Bumex from 1 mg daily to twice daily.     Continue to eat a high protein diet and reduce your salt intake. Follow up with Bariatric Medicine.     Return to the ED if your symptoms do not improve/worsen.